# Patient Record
Sex: MALE | Race: WHITE | Employment: OTHER | URBAN - NONMETROPOLITAN AREA
[De-identification: names, ages, dates, MRNs, and addresses within clinical notes are randomized per-mention and may not be internally consistent; named-entity substitution may affect disease eponyms.]

---

## 2017-05-11 ENCOUNTER — OFFICE VISIT (OUTPATIENT)
Age: 67
End: 2017-05-11

## 2017-05-11 VITALS
DIASTOLIC BLOOD PRESSURE: 80 MMHG | HEART RATE: 84 BPM | BODY MASS INDEX: 24.05 KG/M2 | SYSTOLIC BLOOD PRESSURE: 140 MMHG | TEMPERATURE: 97.6 F | HEIGHT: 70 IN | WEIGHT: 168 LBS | OXYGEN SATURATION: 95 % | RESPIRATION RATE: 16 BRPM

## 2017-05-11 DIAGNOSIS — R53.83 FATIGUE, UNSPECIFIED TYPE: ICD-10-CM

## 2017-05-11 DIAGNOSIS — K40.30 INCARCERATED RIGHT INGUINAL HERNIA: Primary | ICD-10-CM

## 2017-05-11 PROCEDURE — 1036F TOBACCO NON-USER: CPT | Performed by: SURGERY

## 2017-05-11 PROCEDURE — 4040F PNEUMOC VAC/ADMIN/RCVD: CPT | Performed by: SURGERY

## 2017-05-11 PROCEDURE — 3017F COLORECTAL CA SCREEN DOC REV: CPT | Performed by: SURGERY

## 2017-05-11 PROCEDURE — G8420 CALC BMI NORM PARAMETERS: HCPCS | Performed by: SURGERY

## 2017-05-11 PROCEDURE — G8427 DOCREV CUR MEDS BY ELIG CLIN: HCPCS | Performed by: SURGERY

## 2017-05-11 PROCEDURE — 1123F ACP DISCUSS/DSCN MKR DOCD: CPT | Performed by: SURGERY

## 2017-05-11 PROCEDURE — 99204 OFFICE O/P NEW MOD 45 MIN: CPT | Performed by: SURGERY

## 2017-05-11 RX ORDER — M-VIT,TX,IRON,MINS/CALC/FOLIC 27MG-0.4MG
1 TABLET ORAL DAILY
COMMUNITY
End: 2020-01-01

## 2017-05-11 ASSESSMENT — ENCOUNTER SYMPTOMS
DIARRHEA: 0
CONSTIPATION: 0
ABDOMINAL PAIN: 0
NAUSEA: 0
BACK PAIN: 0
TROUBLE SWALLOWING: 0
ABDOMINAL DISTENTION: 0
SHORTNESS OF BREATH: 0
SINUS PRESSURE: 0
CHEST TIGHTNESS: 0
BLOOD IN STOOL: 0
WHEEZING: 0
COUGH: 0

## 2017-05-25 ENCOUNTER — OFFICE VISIT (OUTPATIENT)
Age: 67
End: 2017-05-25

## 2017-05-25 VITALS
HEIGHT: 70 IN | RESPIRATION RATE: 15 BRPM | WEIGHT: 181 LBS | OXYGEN SATURATION: 98 % | TEMPERATURE: 97.1 F | DIASTOLIC BLOOD PRESSURE: 74 MMHG | SYSTOLIC BLOOD PRESSURE: 154 MMHG | BODY MASS INDEX: 25.91 KG/M2 | HEART RATE: 87 BPM

## 2017-05-25 DIAGNOSIS — Z01.818 PRE-OP TESTING: ICD-10-CM

## 2017-05-25 DIAGNOSIS — K40.90 UNILATERAL INGUINAL HERNIA WITHOUT OBSTRUCTION OR GANGRENE, RECURRENCE NOT SPECIFIED: Primary | ICD-10-CM

## 2017-05-25 PROCEDURE — 3017F COLORECTAL CA SCREEN DOC REV: CPT | Performed by: SURGERY

## 2017-05-25 PROCEDURE — 1036F TOBACCO NON-USER: CPT | Performed by: SURGERY

## 2017-05-25 PROCEDURE — 99214 OFFICE O/P EST MOD 30 MIN: CPT | Performed by: SURGERY

## 2017-05-25 PROCEDURE — G8427 DOCREV CUR MEDS BY ELIG CLIN: HCPCS | Performed by: SURGERY

## 2017-05-25 PROCEDURE — 4040F PNEUMOC VAC/ADMIN/RCVD: CPT | Performed by: SURGERY

## 2017-05-25 PROCEDURE — G8420 CALC BMI NORM PARAMETERS: HCPCS | Performed by: SURGERY

## 2017-05-25 PROCEDURE — 1123F ACP DISCUSS/DSCN MKR DOCD: CPT | Performed by: SURGERY

## 2017-05-26 ASSESSMENT — ENCOUNTER SYMPTOMS
SINUS PRESSURE: 0
TROUBLE SWALLOWING: 0
BACK PAIN: 0
ABDOMINAL DISTENTION: 0
BLOOD IN STOOL: 0
CONSTIPATION: 0
SHORTNESS OF BREATH: 0
NAUSEA: 0
COUGH: 0
CHEST TIGHTNESS: 0
WHEEZING: 0
ABDOMINAL PAIN: 0
DIARRHEA: 0

## 2017-05-30 ENCOUNTER — TELEPHONE (OUTPATIENT)
Age: 67
End: 2017-05-30

## 2017-06-06 ENCOUNTER — OFFICE VISIT (OUTPATIENT)
Dept: CARDIOLOGY | Age: 67
End: 2017-06-06

## 2017-06-06 VITALS
BODY MASS INDEX: 27.25 KG/M2 | HEART RATE: 78 BPM | SYSTOLIC BLOOD PRESSURE: 138 MMHG | DIASTOLIC BLOOD PRESSURE: 78 MMHG | WEIGHT: 184 LBS | HEIGHT: 69 IN

## 2017-06-06 DIAGNOSIS — Z01.810 PREOP CARDIOVASCULAR EXAM: ICD-10-CM

## 2017-06-06 DIAGNOSIS — R94.31 EKG, ABNORMAL: Primary | ICD-10-CM

## 2017-06-06 DIAGNOSIS — R94.31 EKG ABNORMALITIES: ICD-10-CM

## 2017-06-06 PROCEDURE — 1123F ACP DISCUSS/DSCN MKR DOCD: CPT | Performed by: INTERNAL MEDICINE

## 2017-06-06 PROCEDURE — 4040F PNEUMOC VAC/ADMIN/RCVD: CPT | Performed by: INTERNAL MEDICINE

## 2017-06-06 PROCEDURE — G8427 DOCREV CUR MEDS BY ELIG CLIN: HCPCS | Performed by: INTERNAL MEDICINE

## 2017-06-06 PROCEDURE — 1036F TOBACCO NON-USER: CPT | Performed by: INTERNAL MEDICINE

## 2017-06-06 PROCEDURE — 3017F COLORECTAL CA SCREEN DOC REV: CPT | Performed by: INTERNAL MEDICINE

## 2017-06-06 PROCEDURE — 99203 OFFICE O/P NEW LOW 30 MIN: CPT | Performed by: INTERNAL MEDICINE

## 2017-06-06 PROCEDURE — G8420 CALC BMI NORM PARAMETERS: HCPCS | Performed by: INTERNAL MEDICINE

## 2017-06-20 ENCOUNTER — TELEPHONE (OUTPATIENT)
Age: 67
End: 2017-06-20

## 2017-06-21 ENCOUNTER — TELEPHONE (OUTPATIENT)
Dept: CARDIOLOGY | Age: 67
End: 2017-06-21

## 2017-06-22 ENCOUNTER — OFFICE VISIT (OUTPATIENT)
Dept: CARDIOLOGY | Age: 67
End: 2017-06-22

## 2017-06-22 VITALS
WEIGHT: 180 LBS | HEART RATE: 76 BPM | BODY MASS INDEX: 25.77 KG/M2 | SYSTOLIC BLOOD PRESSURE: 140 MMHG | DIASTOLIC BLOOD PRESSURE: 92 MMHG | HEIGHT: 70 IN

## 2017-06-22 DIAGNOSIS — Z01.810 PREOP CARDIOVASCULAR EXAM: ICD-10-CM

## 2017-06-22 DIAGNOSIS — R94.39 ABNORMAL STRESS ECG: ICD-10-CM

## 2017-06-22 DIAGNOSIS — R94.39 ABNORMAL STRESS TEST: Primary | ICD-10-CM

## 2017-06-22 PROCEDURE — 1036F TOBACCO NON-USER: CPT | Performed by: INTERNAL MEDICINE

## 2017-06-22 PROCEDURE — G8419 CALC BMI OUT NRM PARAM NOF/U: HCPCS | Performed by: INTERNAL MEDICINE

## 2017-06-22 PROCEDURE — G8427 DOCREV CUR MEDS BY ELIG CLIN: HCPCS | Performed by: INTERNAL MEDICINE

## 2017-06-22 PROCEDURE — 4040F PNEUMOC VAC/ADMIN/RCVD: CPT | Performed by: INTERNAL MEDICINE

## 2017-06-22 PROCEDURE — 99213 OFFICE O/P EST LOW 20 MIN: CPT | Performed by: INTERNAL MEDICINE

## 2017-06-22 PROCEDURE — 1123F ACP DISCUSS/DSCN MKR DOCD: CPT | Performed by: INTERNAL MEDICINE

## 2017-06-22 PROCEDURE — 3017F COLORECTAL CA SCREEN DOC REV: CPT | Performed by: INTERNAL MEDICINE

## 2017-06-27 ENCOUNTER — TELEPHONE (OUTPATIENT)
Dept: CARDIOLOGY | Age: 67
End: 2017-06-27

## 2017-06-27 DIAGNOSIS — R93.1 ABNORMAL FINDINGS ON CARDIAC CATHETERIZATION: Primary | ICD-10-CM

## 2017-06-28 ENCOUNTER — TELEPHONE (OUTPATIENT)
Age: 67
End: 2017-06-28

## 2017-07-12 PROBLEM — I25.10 CORONARY ARTERY DISEASE INVOLVING NATIVE CORONARY ARTERY OF NATIVE HEART WITHOUT ANGINA PECTORIS: Status: ACTIVE | Noted: 2017-07-12

## 2017-07-17 ENCOUNTER — OFFICE VISIT (OUTPATIENT)
Dept: SURGERY | Age: 67
End: 2017-07-17

## 2017-07-17 VITALS
HEART RATE: 77 BPM | WEIGHT: 184.5 LBS | SYSTOLIC BLOOD PRESSURE: 140 MMHG | TEMPERATURE: 97.9 F | DIASTOLIC BLOOD PRESSURE: 80 MMHG | HEIGHT: 69 IN | OXYGEN SATURATION: 96 % | BODY MASS INDEX: 27.33 KG/M2 | RESPIRATION RATE: 16 BRPM

## 2017-07-17 DIAGNOSIS — K40.30 INCARCERATED RIGHT INGUINAL HERNIA: Primary | ICD-10-CM

## 2017-07-17 DIAGNOSIS — Z98.890 POST-OPERATIVE STATE: ICD-10-CM

## 2017-07-17 PROCEDURE — 99024 POSTOP FOLLOW-UP VISIT: CPT | Performed by: SURGERY

## 2017-07-24 ENCOUNTER — OFFICE VISIT (OUTPATIENT)
Dept: SURGERY | Age: 67
End: 2017-07-24

## 2017-07-24 DIAGNOSIS — Z48.03 CHANGE OR REMOVAL OF DRAINS: Primary | ICD-10-CM

## 2017-07-24 PROCEDURE — 99024 POSTOP FOLLOW-UP VISIT: CPT | Performed by: SURGERY

## 2017-07-26 ENCOUNTER — TELEPHONE (OUTPATIENT)
Dept: CARDIOLOGY CLINIC | Age: 67
End: 2017-07-26

## 2017-07-26 ENCOUNTER — HOSPITAL ENCOUNTER (OUTPATIENT)
Dept: CARDIAC CATH/INVASIVE PROCEDURES | Age: 67
Discharge: HOME | End: 2017-07-26
Attending: INTERNAL MEDICINE
Payer: MEDICARE

## 2017-07-26 ENCOUNTER — HOSPITAL ENCOUNTER (OUTPATIENT)
Dept: INPATIENT UNIT | Age: 67
Discharge: HOME | End: 2017-07-26
Attending: INTERNAL MEDICINE | Admitting: INTERNAL MEDICINE
Payer: MEDICARE

## 2017-07-26 VITALS
RESPIRATION RATE: 21 BRPM | OXYGEN SATURATION: 95 % | HEART RATE: 64 BPM | WEIGHT: 180 LBS | HEIGHT: 69 IN | TEMPERATURE: 98.8 F | BODY MASS INDEX: 26.66 KG/M2 | SYSTOLIC BLOOD PRESSURE: 113 MMHG | DIASTOLIC BLOOD PRESSURE: 66 MMHG

## 2017-07-26 LAB
ABO: NORMAL
ANION GAP SERPL CALCULATED.3IONS-SCNC: 13 MEQ/L (ref 8–16)
ANTIBODY SCREEN: NORMAL
BUN BLDV-MCNC: 12 MG/DL (ref 7–22)
CALCIUM SERPL-MCNC: 8.8 MG/DL (ref 8.5–10.5)
CHLORIDE BLD-SCNC: 96 MEQ/L (ref 98–111)
CO2: 25 MEQ/L (ref 23–33)
CREAT SERPL-MCNC: 0.6 MG/DL (ref 0.4–1.2)
EKG ATRIAL RATE: 64 BPM
EKG P AXIS: 36 DEGREES
EKG P-R INTERVAL: 206 MS
EKG Q-T INTERVAL: 446 MS
EKG QRS DURATION: 128 MS
EKG QTC CALCULATION (BAZETT): 460 MS
EKG R AXIS: 7 DEGREES
EKG T AXIS: 10 DEGREES
EKG VENTRICULAR RATE: 64 BPM
GFR SERPL CREATININE-BSD FRML MDRD: > 90 ML/MIN/1.73M2
GLUCOSE BLD-MCNC: 97 MG/DL (ref 70–108)
HCT VFR BLD CALC: 38.7 % (ref 42–52)
HEMOGLOBIN: 13.1 GM/DL (ref 14–18)
MCH RBC QN AUTO: 28.8 PG (ref 27–31)
MCHC RBC AUTO-ENTMCNC: 33.9 GM/DL (ref 33–37)
MCV RBC AUTO: 85 FL (ref 80–94)
PDW BLD-RTO: 14 % (ref 11.5–14.5)
PLATELET # BLD: 543 THOU/MM3 (ref 130–400)
PMV BLD AUTO: 7.3 MCM (ref 7.4–10.4)
POTASSIUM SERPL-SCNC: 4.2 MEQ/L (ref 3.5–5.2)
RBC # BLD: 4.55 MILL/MM3 (ref 4.7–6.1)
RH FACTOR: NORMAL
SODIUM BLD-SCNC: 134 MEQ/L (ref 135–145)
WBC # BLD: 10.5 THOU/MM3 (ref 4.8–10.8)

## 2017-07-26 PROCEDURE — 6360000002 HC RX W HCPCS

## 2017-07-26 PROCEDURE — C1769 GUIDE WIRE: HCPCS

## 2017-07-26 PROCEDURE — 6370000000 HC RX 637 (ALT 250 FOR IP)

## 2017-07-26 PROCEDURE — C1874 STENT, COATED/COV W/DEL SYS: HCPCS

## 2017-07-26 PROCEDURE — C9600 PERC DRUG-EL COR STENT SING: HCPCS | Performed by: INTERNAL MEDICINE

## 2017-07-26 PROCEDURE — A4216 STERILE WATER/SALINE, 10 ML: HCPCS

## 2017-07-26 PROCEDURE — 86901 BLOOD TYPING SEROLOGIC RH(D): CPT

## 2017-07-26 PROCEDURE — 93005 ELECTROCARDIOGRAM TRACING: CPT

## 2017-07-26 PROCEDURE — 92928 PRQ TCAT PLMT NTRAC ST 1 LES: CPT | Performed by: INTERNAL MEDICINE

## 2017-07-26 PROCEDURE — 86850 RBC ANTIBODY SCREEN: CPT

## 2017-07-26 PROCEDURE — 80048 BASIC METABOLIC PNL TOTAL CA: CPT

## 2017-07-26 PROCEDURE — C1887 CATHETER, GUIDING: HCPCS

## 2017-07-26 PROCEDURE — C1894 INTRO/SHEATH, NON-LASER: HCPCS

## 2017-07-26 PROCEDURE — 85027 COMPLETE CBC AUTOMATED: CPT

## 2017-07-26 PROCEDURE — 2500000003 HC RX 250 WO HCPCS

## 2017-07-26 PROCEDURE — 2580000003 HC RX 258: Performed by: INTERNAL MEDICINE

## 2017-07-26 PROCEDURE — 86900 BLOOD TYPING SEROLOGIC ABO: CPT

## 2017-07-26 PROCEDURE — C9601 PERC DRUG-EL COR STENT BRAN: HCPCS | Performed by: INTERNAL MEDICINE

## 2017-07-26 PROCEDURE — 2580000003 HC RX 258

## 2017-07-26 PROCEDURE — 36415 COLL VENOUS BLD VENIPUNCTURE: CPT

## 2017-07-26 PROCEDURE — 92929 PR PRQ TRLUML CORONARY STENT W/ANGIO ADDL ART/BRNCH: CPT | Performed by: INTERNAL MEDICINE

## 2017-07-26 RX ORDER — PRASUGREL 10 MG/1
10 TABLET, FILM COATED ORAL DAILY
Qty: 30 TABLET | Refills: 2 | Status: SHIPPED | OUTPATIENT
Start: 2017-07-27 | End: 2017-09-13 | Stop reason: ALTCHOICE

## 2017-07-26 RX ORDER — SODIUM CHLORIDE 0.9 % (FLUSH) 0.9 %
10 SYRINGE (ML) INJECTION PRN
Status: DISCONTINUED | OUTPATIENT
Start: 2017-07-26 | End: 2017-07-26 | Stop reason: HOSPADM

## 2017-07-26 RX ORDER — NITROGLYCERIN 0.4 MG/1
0.4 TABLET SUBLINGUAL EVERY 5 MIN PRN
Status: DISCONTINUED | OUTPATIENT
Start: 2017-07-26 | End: 2017-07-26 | Stop reason: HOSPADM

## 2017-07-26 RX ORDER — SODIUM CHLORIDE 9 MG/ML
100 INJECTION, SOLUTION INTRAVENOUS CONTINUOUS
Status: DISCONTINUED | OUTPATIENT
Start: 2017-07-26 | End: 2017-07-26 | Stop reason: HOSPADM

## 2017-07-26 RX ORDER — SODIUM CHLORIDE 0.9 % (FLUSH) 0.9 %
10 SYRINGE (ML) INJECTION EVERY 12 HOURS SCHEDULED
Status: CANCELLED | OUTPATIENT
Start: 2017-07-26

## 2017-07-26 RX ORDER — ACETAMINOPHEN 325 MG/1
650 TABLET ORAL EVERY 4 HOURS PRN
Status: DISCONTINUED | OUTPATIENT
Start: 2017-07-26 | End: 2017-07-26 | Stop reason: HOSPADM

## 2017-07-26 RX ORDER — SODIUM CHLORIDE 9 MG/ML
100 INJECTION, SOLUTION INTRAVENOUS CONTINUOUS
Status: CANCELLED | OUTPATIENT
Start: 2017-07-26 | End: 2017-07-26

## 2017-07-26 RX ORDER — SODIUM CHLORIDE 9 MG/ML
INJECTION, SOLUTION INTRAVENOUS CONTINUOUS
Status: DISCONTINUED | OUTPATIENT
Start: 2017-07-26 | End: 2017-07-26

## 2017-07-26 RX ORDER — SODIUM CHLORIDE 0.9 % (FLUSH) 0.9 %
10 SYRINGE (ML) INJECTION EVERY 12 HOURS SCHEDULED
Status: DISCONTINUED | OUTPATIENT
Start: 2017-07-26 | End: 2017-07-26 | Stop reason: HOSPADM

## 2017-07-26 RX ORDER — ASPIRIN 325 MG
325 TABLET ORAL ONCE
Status: DISCONTINUED | OUTPATIENT
Start: 2017-07-26 | End: 2017-07-26 | Stop reason: HOSPADM

## 2017-07-26 RX ORDER — SODIUM CHLORIDE 0.9 % (FLUSH) 0.9 %
10 SYRINGE (ML) INJECTION PRN
Status: DISCONTINUED | OUTPATIENT
Start: 2017-07-26 | End: 2017-07-26

## 2017-07-26 RX ORDER — ACETAMINOPHEN 325 MG/1
650 TABLET ORAL EVERY 4 HOURS PRN
Status: CANCELLED | OUTPATIENT
Start: 2017-07-26

## 2017-07-26 RX ORDER — SODIUM CHLORIDE 0.9 % (FLUSH) 0.9 %
10 SYRINGE (ML) INJECTION PRN
Status: CANCELLED | OUTPATIENT
Start: 2017-07-26

## 2017-07-26 RX ORDER — ONDANSETRON 2 MG/ML
4 INJECTION INTRAMUSCULAR; INTRAVENOUS EVERY 6 HOURS PRN
Status: DISCONTINUED | OUTPATIENT
Start: 2017-07-26 | End: 2017-07-26 | Stop reason: HOSPADM

## 2017-07-26 RX ORDER — ONDANSETRON 2 MG/ML
4 INJECTION INTRAMUSCULAR; INTRAVENOUS EVERY 6 HOURS PRN
Status: CANCELLED | OUTPATIENT
Start: 2017-07-26

## 2017-07-26 RX ORDER — SODIUM CHLORIDE 0.9 % (FLUSH) 0.9 %
10 SYRINGE (ML) INJECTION EVERY 12 HOURS SCHEDULED
Status: DISCONTINUED | OUTPATIENT
Start: 2017-07-26 | End: 2017-07-26

## 2017-07-26 RX ADMIN — SODIUM CHLORIDE: 9 INJECTION, SOLUTION INTRAVENOUS at 08:14

## 2017-07-26 ASSESSMENT — PAIN SCALES - GENERAL: PAINLEVEL_OUTOF10: 0

## 2017-07-31 ENCOUNTER — OFFICE VISIT (OUTPATIENT)
Dept: CARDIOLOGY CLINIC | Age: 67
End: 2017-07-31
Payer: MEDICARE

## 2017-07-31 ENCOUNTER — OFFICE VISIT (OUTPATIENT)
Dept: SURGERY | Age: 67
End: 2017-07-31

## 2017-07-31 VITALS
SYSTOLIC BLOOD PRESSURE: 136 MMHG | HEART RATE: 76 BPM | TEMPERATURE: 98.5 F | BODY MASS INDEX: 25.53 KG/M2 | OXYGEN SATURATION: 96 % | DIASTOLIC BLOOD PRESSURE: 66 MMHG | WEIGHT: 172.4 LBS | RESPIRATION RATE: 18 BRPM | HEIGHT: 69 IN

## 2017-07-31 VITALS
SYSTOLIC BLOOD PRESSURE: 136 MMHG | BODY MASS INDEX: 25.45 KG/M2 | HEIGHT: 69 IN | HEART RATE: 64 BPM | DIASTOLIC BLOOD PRESSURE: 70 MMHG | WEIGHT: 171.8 LBS

## 2017-07-31 DIAGNOSIS — K40.30 INCARCERATED RIGHT INGUINAL HERNIA: Primary | ICD-10-CM

## 2017-07-31 DIAGNOSIS — I25.10 CORONARY ARTERY DISEASE INVOLVING NATIVE CORONARY ARTERY OF NATIVE HEART WITHOUT ANGINA PECTORIS: ICD-10-CM

## 2017-07-31 DIAGNOSIS — Z95.820 S/P ANGIOPLASTY WITH STENT: ICD-10-CM

## 2017-07-31 DIAGNOSIS — I25.10 CORONARY ARTERY DISEASE INVOLVING NATIVE CORONARY ARTERY OF NATIVE HEART WITHOUT ANGINA PECTORIS: Primary | ICD-10-CM

## 2017-07-31 PROCEDURE — 99213 OFFICE O/P EST LOW 20 MIN: CPT | Performed by: PHYSICIAN ASSISTANT

## 2017-07-31 PROCEDURE — 3017F COLORECTAL CA SCREEN DOC REV: CPT | Performed by: PHYSICIAN ASSISTANT

## 2017-07-31 PROCEDURE — 99024 POSTOP FOLLOW-UP VISIT: CPT | Performed by: SURGERY

## 2017-07-31 PROCEDURE — 1123F ACP DISCUSS/DSCN MKR DOCD: CPT | Performed by: PHYSICIAN ASSISTANT

## 2017-07-31 PROCEDURE — G8419 CALC BMI OUT NRM PARAM NOF/U: HCPCS | Performed by: PHYSICIAN ASSISTANT

## 2017-07-31 PROCEDURE — G8598 ASA/ANTIPLAT THER USED: HCPCS | Performed by: PHYSICIAN ASSISTANT

## 2017-07-31 PROCEDURE — 1111F DSCHRG MED/CURRENT MED MERGE: CPT | Performed by: PHYSICIAN ASSISTANT

## 2017-07-31 PROCEDURE — G8427 DOCREV CUR MEDS BY ELIG CLIN: HCPCS | Performed by: PHYSICIAN ASSISTANT

## 2017-07-31 PROCEDURE — 4040F PNEUMOC VAC/ADMIN/RCVD: CPT | Performed by: PHYSICIAN ASSISTANT

## 2017-07-31 PROCEDURE — 1036F TOBACCO NON-USER: CPT | Performed by: PHYSICIAN ASSISTANT

## 2017-07-31 RX ORDER — CLOPIDOGREL BISULFATE 75 MG/1
75 TABLET ORAL DAILY
Qty: 30 TABLET | Refills: 3 | Status: SHIPPED | OUTPATIENT
Start: 2017-07-31 | End: 2017-09-13 | Stop reason: SDUPTHER

## 2017-08-11 ENCOUNTER — TELEPHONE (OUTPATIENT)
Dept: CARDIOLOGY CLINIC | Age: 67
End: 2017-08-11

## 2017-09-11 ENCOUNTER — TELEPHONE (OUTPATIENT)
Dept: CARDIOLOGY CLINIC | Age: 67
End: 2017-09-11

## 2017-09-13 ENCOUNTER — OFFICE VISIT (OUTPATIENT)
Dept: CARDIOLOGY CLINIC | Age: 67
End: 2017-09-13
Payer: MEDICARE

## 2017-09-13 VITALS
HEIGHT: 69 IN | BODY MASS INDEX: 25.77 KG/M2 | WEIGHT: 174 LBS | SYSTOLIC BLOOD PRESSURE: 140 MMHG | HEART RATE: 76 BPM | DIASTOLIC BLOOD PRESSURE: 68 MMHG

## 2017-09-13 DIAGNOSIS — I25.10 CORONARY ARTERY DISEASE INVOLVING NATIVE CORONARY ARTERY OF NATIVE HEART WITHOUT ANGINA PECTORIS: Primary | ICD-10-CM

## 2017-09-13 PROCEDURE — G8598 ASA/ANTIPLAT THER USED: HCPCS | Performed by: INTERNAL MEDICINE

## 2017-09-13 PROCEDURE — G8427 DOCREV CUR MEDS BY ELIG CLIN: HCPCS | Performed by: INTERNAL MEDICINE

## 2017-09-13 PROCEDURE — 1123F ACP DISCUSS/DSCN MKR DOCD: CPT | Performed by: INTERNAL MEDICINE

## 2017-09-13 PROCEDURE — 99213 OFFICE O/P EST LOW 20 MIN: CPT | Performed by: INTERNAL MEDICINE

## 2017-09-13 PROCEDURE — 1036F TOBACCO NON-USER: CPT | Performed by: INTERNAL MEDICINE

## 2017-09-13 PROCEDURE — 3017F COLORECTAL CA SCREEN DOC REV: CPT | Performed by: INTERNAL MEDICINE

## 2017-09-13 PROCEDURE — G8417 CALC BMI ABV UP PARAM F/U: HCPCS | Performed by: INTERNAL MEDICINE

## 2017-09-13 PROCEDURE — 4040F PNEUMOC VAC/ADMIN/RCVD: CPT | Performed by: INTERNAL MEDICINE

## 2017-09-13 RX ORDER — SIMVASTATIN 20 MG
20 TABLET ORAL NIGHTLY
Qty: 30 TABLET | Refills: 5 | Status: SHIPPED | OUTPATIENT
Start: 2017-09-13 | End: 2020-01-01

## 2017-09-13 RX ORDER — ASPIRIN 81 MG/1
81 TABLET ORAL DAILY
Qty: 30 TABLET | Refills: 5 | Status: SHIPPED | OUTPATIENT
Start: 2017-09-13 | End: 2020-01-01

## 2017-09-13 RX ORDER — CLOPIDOGREL BISULFATE 75 MG/1
75 TABLET ORAL DAILY
Qty: 30 TABLET | Refills: 5 | Status: SHIPPED | OUTPATIENT
Start: 2017-09-13 | End: 2020-01-01

## 2017-09-13 RX ORDER — AMLODIPINE BESYLATE 2.5 MG/1
2.5 TABLET ORAL DAILY
Qty: 30 TABLET | Refills: 5 | Status: SHIPPED | OUTPATIENT
Start: 2017-09-13 | End: 2020-01-01

## 2017-09-13 RX ORDER — METOPROLOL TARTRATE 37.5 MG/1
37.5 TABLET, FILM COATED ORAL 2 TIMES DAILY
Qty: 60 TABLET | Refills: 5 | Status: SHIPPED | OUTPATIENT
Start: 2017-09-13 | End: 2020-01-01

## 2018-09-26 PROBLEM — Z01.810 PREOP CARDIOVASCULAR EXAM: Status: RESOLVED | Noted: 2017-06-06 | Resolved: 2018-09-26

## 2020-01-01 ENCOUNTER — TELEPHONE (OUTPATIENT)
Dept: FAMILY MEDICINE CLINIC | Age: 70
End: 2020-01-01

## 2020-01-01 ENCOUNTER — OFFICE VISIT (OUTPATIENT)
Dept: FAMILY MEDICINE CLINIC | Age: 70
End: 2020-01-01
Payer: MEDICARE

## 2020-01-01 ENCOUNTER — APPOINTMENT (OUTPATIENT)
Dept: INTERVENTIONAL RADIOLOGY/VASCULAR | Age: 70
End: 2020-01-01
Payer: MEDICARE

## 2020-01-01 ENCOUNTER — HOSPITAL ENCOUNTER (OUTPATIENT)
Age: 70
Setting detail: OBSERVATION
Discharge: HOME | End: 2020-06-02
Attending: EMERGENCY MEDICINE | Admitting: INTERNAL MEDICINE
Payer: MEDICARE

## 2020-01-01 ENCOUNTER — HOSPITAL ENCOUNTER (INPATIENT)
Age: 70
LOS: 2 days | Discharge: ANOTHER HOSPITAL | DRG: 435 | End: 2020-07-08
Attending: EMERGENCY MEDICINE | Admitting: INTERNAL MEDICINE
Payer: MEDICARE

## 2020-01-01 ENCOUNTER — HOSPITAL ENCOUNTER (OUTPATIENT)
Dept: CT IMAGING | Age: 70
Discharge: HOME | End: 2020-06-22
Payer: MEDICARE

## 2020-01-01 ENCOUNTER — APPOINTMENT (OUTPATIENT)
Dept: GENERAL RADIOLOGY | Age: 70
End: 2020-01-01
Payer: MEDICARE

## 2020-01-01 ENCOUNTER — ANESTHESIA EVENT (OUTPATIENT)
Dept: ENDOSCOPY | Age: 70
DRG: 435 | End: 2020-01-01
Payer: MEDICARE

## 2020-01-01 ENCOUNTER — APPOINTMENT (OUTPATIENT)
Dept: CT IMAGING | Age: 70
End: 2020-01-01
Payer: MEDICARE

## 2020-01-01 ENCOUNTER — HOSPITAL ENCOUNTER (OUTPATIENT)
Dept: CT IMAGING | Age: 70
Discharge: HOME | End: 2020-06-30
Payer: MEDICARE

## 2020-01-01 ENCOUNTER — HOSPITAL ENCOUNTER (OUTPATIENT)
Age: 70
Discharge: HOME | End: 2020-06-24
Payer: MEDICARE

## 2020-01-01 ENCOUNTER — ANESTHESIA (OUTPATIENT)
Dept: ENDOSCOPY | Age: 70
DRG: 435 | End: 2020-01-01
Payer: MEDICARE

## 2020-01-01 ENCOUNTER — HOSPITAL ENCOUNTER (OUTPATIENT)
Age: 70
Discharge: HOME | End: 2020-06-30
Payer: MEDICARE

## 2020-01-01 VITALS
WEIGHT: 140 LBS | DIASTOLIC BLOOD PRESSURE: 76 MMHG | HEART RATE: 90 BPM | SYSTOLIC BLOOD PRESSURE: 124 MMHG | OXYGEN SATURATION: 98 % | TEMPERATURE: 97 F | RESPIRATION RATE: 16 BRPM | BODY MASS INDEX: 20.09 KG/M2

## 2020-01-01 VITALS
OXYGEN SATURATION: 96 % | HEART RATE: 74 BPM | WEIGHT: 148.7 LBS | SYSTOLIC BLOOD PRESSURE: 114 MMHG | HEIGHT: 70 IN | BODY MASS INDEX: 21.29 KG/M2 | DIASTOLIC BLOOD PRESSURE: 75 MMHG | TEMPERATURE: 97.9 F | RESPIRATION RATE: 18 BRPM

## 2020-01-01 VITALS
RESPIRATION RATE: 20 BRPM | BODY MASS INDEX: 20.4 KG/M2 | DIASTOLIC BLOOD PRESSURE: 84 MMHG | TEMPERATURE: 98.1 F | WEIGHT: 142.5 LBS | HEART RATE: 79 BPM | HEIGHT: 70 IN | SYSTOLIC BLOOD PRESSURE: 139 MMHG | OXYGEN SATURATION: 96 %

## 2020-01-01 VITALS
HEIGHT: 70 IN | DIASTOLIC BLOOD PRESSURE: 86 MMHG | WEIGHT: 142.2 LBS | OXYGEN SATURATION: 97 % | BODY MASS INDEX: 20.36 KG/M2 | TEMPERATURE: 97.8 F | SYSTOLIC BLOOD PRESSURE: 122 MMHG | RESPIRATION RATE: 18 BRPM | HEART RATE: 62 BPM

## 2020-01-01 VITALS — SYSTOLIC BLOOD PRESSURE: 130 MMHG | DIASTOLIC BLOOD PRESSURE: 83 MMHG | OXYGEN SATURATION: 100 %

## 2020-01-01 VITALS
BODY MASS INDEX: 20.83 KG/M2 | DIASTOLIC BLOOD PRESSURE: 82 MMHG | WEIGHT: 140.6 LBS | RESPIRATION RATE: 18 BRPM | TEMPERATURE: 97.6 F | HEIGHT: 69 IN | OXYGEN SATURATION: 98 % | SYSTOLIC BLOOD PRESSURE: 118 MMHG | HEART RATE: 81 BPM

## 2020-01-01 LAB
ABSOLUTE RETIC #: 90 THOU/MM3 (ref 20–115)
AFP-TUMOR MARKER: 12.2 UG/L
ALBUMIN SERPL-MCNC: 2.5 G/DL (ref 3.5–5.1)
ALBUMIN SERPL-MCNC: 3 G/DL (ref 3.5–5.1)
ALBUMIN SERPL-MCNC: 3.2 G/DL (ref 3.5–5.1)
ALP BLD-CCNC: 258 U/L (ref 38–126)
ALP BLD-CCNC: 350 U/L (ref 38–126)
ALP BLD-CCNC: 403 U/L (ref 38–126)
ALT SERPL-CCNC: 144 U/L (ref 11–66)
ALT SERPL-CCNC: 51 U/L (ref 11–66)
ALT SERPL-CCNC: 67 U/L (ref 11–66)
AMORPHOUS: ABNORMAL
ANION GAP SERPL CALCULATED.3IONS-SCNC: 10 MEQ/L (ref 8–16)
ANION GAP SERPL CALCULATED.3IONS-SCNC: 10 MEQ/L (ref 8–16)
ANION GAP SERPL CALCULATED.3IONS-SCNC: 11 MEQ/L (ref 8–16)
ANION GAP SERPL CALCULATED.3IONS-SCNC: 11 MEQ/L (ref 8–16)
ANION GAP SERPL CALCULATED.3IONS-SCNC: 12 MEQ/L (ref 8–16)
ANION GAP SERPL CALCULATED.3IONS-SCNC: 8 MEQ/L (ref 8–16)
ANION GAP SERPL CALCULATED.3IONS-SCNC: 9 MEQ/L (ref 8–16)
APTT: 29.4 SECONDS (ref 22–38)
APTT: 30.4 SECONDS (ref 22–38)
AST SERPL-CCNC: 29 U/L (ref 5–40)
AST SERPL-CCNC: 43 U/L (ref 5–40)
AST SERPL-CCNC: 86 U/L (ref 5–40)
AVERAGE GLUCOSE: 120 MG/DL (ref 70–126)
BACTERIA: ABNORMAL /HPF
BASOPHILS # BLD: 0.1 %
BASOPHILS # BLD: 0.2 %
BASOPHILS # BLD: 0.4 %
BASOPHILS ABSOLUTE: 0 THOU/MM3 (ref 0–0.1)
BILIRUB SERPL-MCNC: 12.5 MG/DL (ref 0.3–1.2)
BILIRUB SERPL-MCNC: 12.8 MG/DL (ref 0.3–1.2)
BILIRUB SERPL-MCNC: 7.5 MG/DL (ref 0.3–1.2)
BILIRUBIN DIRECT: 5.6 MG/DL (ref 0–0.3)
BILIRUBIN DIRECT: 9.2 MG/DL (ref 0–0.3)
BILIRUBIN DIRECT: > 10 MG/DL (ref 0–0.3)
BILIRUBIN URINE: ABNORMAL
BILIRUBIN, POC: ABNORMAL
BLOOD URINE, POC: NEGATIVE
BLOOD, URINE: NEGATIVE
BUN BLDV-MCNC: 10 MG/DL (ref 7–22)
BUN BLDV-MCNC: 11 MG/DL (ref 7–22)
BUN BLDV-MCNC: 12 MG/DL (ref 7–22)
BUN BLDV-MCNC: 12 MG/DL (ref 7–22)
BUN BLDV-MCNC: 9 MG/DL (ref 7–22)
CA 19-9: 456 U/ML (ref 0–35)
CALCIUM IONIZED: 0.95 MMOL/L (ref 1.12–1.32)
CALCIUM IONIZED: 1.02 MMOL/L (ref 1.12–1.32)
CALCIUM SERPL-MCNC: 7.7 MG/DL (ref 8.5–10.5)
CALCIUM SERPL-MCNC: 7.8 MG/DL (ref 8.5–10.5)
CALCIUM SERPL-MCNC: 7.9 MG/DL (ref 8.5–10.5)
CALCIUM SERPL-MCNC: 8.1 MG/DL (ref 8.5–10.5)
CALCIUM SERPL-MCNC: 8.1 MG/DL (ref 8.5–10.5)
CALCIUM SERPL-MCNC: 8.2 MG/DL (ref 8.5–10.5)
CALCIUM SERPL-MCNC: 8.5 MG/DL (ref 8.5–10.5)
CASTS 2: ABNORMAL /LPF
CASTS UA: ABNORMAL /LPF
CHARACTER, URINE: ABNORMAL
CHLORIDE BLD-SCNC: 101 MEQ/L (ref 98–111)
CHLORIDE BLD-SCNC: 81 MEQ/L (ref 98–111)
CHLORIDE BLD-SCNC: 83 MEQ/L (ref 98–111)
CHLORIDE BLD-SCNC: 88 MEQ/L (ref 98–111)
CHLORIDE BLD-SCNC: 91 MEQ/L (ref 98–111)
CHLORIDE BLD-SCNC: 91 MEQ/L (ref 98–111)
CHLORIDE BLD-SCNC: 92 MEQ/L (ref 98–111)
CHLORIDE BLD-SCNC: 93 MEQ/L (ref 98–111)
CHLORIDE BLD-SCNC: 97 MEQ/L (ref 98–111)
CHOLESTEROL, TOTAL: 78 MG/DL (ref 100–199)
CLARITY, POC: ABNORMAL
CO2: 22 MEQ/L (ref 23–33)
CO2: 24 MEQ/L (ref 23–33)
CO2: 25 MEQ/L (ref 23–33)
CO2: 25 MEQ/L (ref 23–33)
CO2: 29 MEQ/L (ref 23–33)
CO2: 29 MEQ/L (ref 23–33)
COLOR, POC: ABNORMAL
COLOR: ABNORMAL
CREAT SERPL-MCNC: 0.3 MG/DL (ref 0.4–1.2)
CREAT SERPL-MCNC: 0.4 MG/DL (ref 0.4–1.2)
CREAT SERPL-MCNC: 0.7 MG/DL (ref 0.4–1.2)
CREAT SERPL-MCNC: < 0.2 MG/DL (ref 0.4–1.2)
CREATININE URINE: 93.3 MG/DL
CRYSTALS, UA: ABNORMAL
D-DIMER QUANTITATIVE: 587 NG/ML FEU (ref 0–500)
EKG ATRIAL RATE: 112 BPM
EKG ATRIAL RATE: 114 BPM
EKG ATRIAL RATE: 83 BPM
EKG ATRIAL RATE: 84 BPM
EKG ATRIAL RATE: 94 BPM
EKG P AXIS: 19 DEGREES
EKG P AXIS: 38 DEGREES
EKG P AXIS: 47 DEGREES
EKG P AXIS: 48 DEGREES
EKG P AXIS: 50 DEGREES
EKG P-R INTERVAL: 178 MS
EKG P-R INTERVAL: 180 MS
EKG P-R INTERVAL: 194 MS
EKG P-R INTERVAL: 200 MS
EKG P-R INTERVAL: 206 MS
EKG Q-T INTERVAL: 358 MS
EKG Q-T INTERVAL: 364 MS
EKG Q-T INTERVAL: 384 MS
EKG Q-T INTERVAL: 394 MS
EKG Q-T INTERVAL: 400 MS
EKG QRS DURATION: 124 MS
EKG QRS DURATION: 126 MS
EKG QRS DURATION: 130 MS
EKG QTC CALCULATION (BAZETT): 465 MS
EKG QTC CALCULATION (BAZETT): 470 MS
EKG QTC CALCULATION (BAZETT): 480 MS
EKG QTC CALCULATION (BAZETT): 493 MS
EKG QTC CALCULATION (BAZETT): 496 MS
EKG R AXIS: -15 DEGREES
EKG R AXIS: -8 DEGREES
EKG R AXIS: 0 DEGREES
EKG R AXIS: 16 DEGREES
EKG R AXIS: 3 DEGREES
EKG T AXIS: -6 DEGREES
EKG T AXIS: 15 DEGREES
EKG T AXIS: 21 DEGREES
EKG T AXIS: 6 DEGREES
EKG T AXIS: 6 DEGREES
EKG VENTRICULAR RATE: 112 BPM
EKG VENTRICULAR RATE: 114 BPM
EKG VENTRICULAR RATE: 83 BPM
EKG VENTRICULAR RATE: 84 BPM
EKG VENTRICULAR RATE: 94 BPM
EOSINOPHIL # BLD: 0 %
EOSINOPHIL # BLD: 0 %
EOSINOPHIL # BLD: 0.5 %
EOSINOPHILS ABSOLUTE: 0 THOU/MM3 (ref 0–0.4)
EOSINOPHILS ABSOLUTE: 0 THOU/MM3 (ref 0–0.4)
EOSINOPHILS ABSOLUTE: 0.1 THOU/MM3 (ref 0–0.4)
EPITHELIAL CELLS, UA: ABNORMAL /HPF
ERYTHROCYTE [DISTWIDTH] IN BLOOD BY AUTOMATED COUNT: 14.5 % (ref 11.5–14.5)
ERYTHROCYTE [DISTWIDTH] IN BLOOD BY AUTOMATED COUNT: 14.5 % (ref 11.5–14.5)
ERYTHROCYTE [DISTWIDTH] IN BLOOD BY AUTOMATED COUNT: 18.2 % (ref 11.5–14.5)
ERYTHROCYTE [DISTWIDTH] IN BLOOD BY AUTOMATED COUNT: 18.3 % (ref 11.5–14.5)
ERYTHROCYTE [DISTWIDTH] IN BLOOD BY AUTOMATED COUNT: 45.4 FL (ref 35–45)
ERYTHROCYTE [DISTWIDTH] IN BLOOD BY AUTOMATED COUNT: 46.5 FL (ref 35–45)
ERYTHROCYTE [DISTWIDTH] IN BLOOD BY AUTOMATED COUNT: 50.9 FL (ref 35–45)
ERYTHROCYTE [DISTWIDTH] IN BLOOD BY AUTOMATED COUNT: 52.6 FL (ref 35–45)
FERRITIN: 2170 NG/ML (ref 22–322)
FOLATE: 4.6 NG/ML (ref 4.8–24.2)
GFR SERPL CREATININE-BSD FRML MDRD: > 90 ML/MIN/1.73M2
GLUCOSE BLD-MCNC: 107 MG/DL (ref 70–108)
GLUCOSE BLD-MCNC: 118 MG/DL (ref 70–108)
GLUCOSE BLD-MCNC: 120 MG/DL (ref 70–108)
GLUCOSE BLD-MCNC: 124 MG/DL (ref 70–108)
GLUCOSE BLD-MCNC: 143 MG/DL (ref 70–108)
GLUCOSE BLD-MCNC: 190 MG/DL (ref 70–108)
GLUCOSE BLD-MCNC: 196 MG/DL (ref 70–108)
GLUCOSE BLD-MCNC: 229 MG/DL (ref 70–108)
GLUCOSE BLD-MCNC: 87 MG/DL (ref 70–108)
GLUCOSE BLD-MCNC: 87 MG/DL (ref 70–108)
GLUCOSE URINE, POC: ABNORMAL
GLUCOSE URINE: NEGATIVE MG/DL
HBA1C MFR BLD: 5.9 %
HBA1C MFR BLD: 6 % (ref 4.4–6.4)
HCT VFR BLD CALC: 30.9 % (ref 42–52)
HCT VFR BLD CALC: 33.3 % (ref 42–52)
HCT VFR BLD CALC: 39.6 % (ref 42–52)
HCT VFR BLD CALC: 42.3 % (ref 42–52)
HDLC SERPL-MCNC: 32 MG/DL
HEMOGLOBIN: 11.3 GM/DL (ref 14–18)
HEMOGLOBIN: 12.3 GM/DL (ref 14–18)
HEMOGLOBIN: 13.5 GM/DL (ref 14–18)
HEMOGLOBIN: 14.2 GM/DL (ref 14–18)
ICTOTEST: POSITIVE
IMMATURE GRANS (ABS): 0.04 THOU/MM3 (ref 0–0.07)
IMMATURE GRANS (ABS): 0.1 THOU/MM3 (ref 0–0.07)
IMMATURE GRANS (ABS): 0.12 THOU/MM3 (ref 0–0.07)
IMMATURE GRANULOCYTES: 0.4 %
IMMATURE GRANULOCYTES: 0.6 %
IMMATURE GRANULOCYTES: 0.9 %
IMMATURE RETIC FRACT: 7.2 % (ref 2.3–13.4)
INR BLD: 1.29 (ref 0.85–1.13)
INR BLD: 1.31 (ref 0.85–1.13)
INR BLD: 2.5 (ref 0.85–1.13)
IRON SATURATION: 40 % (ref 20–50)
IRON: 53 UG/DL (ref 65–195)
KETONES, POC: ABNORMAL
KETONES, URINE: 40
LDL CHOLESTEROL CALCULATED: 36 MG/DL
LEUKOCYTE EST, POC: NEGATIVE
LEUKOCYTE ESTERASE, URINE: ABNORMAL
LIPASE: 8.5 U/L (ref 5.6–51.3)
LV EF: 63 %
LVEF MODALITY: NORMAL
LYMPHOCYTES # BLD: 18 %
LYMPHOCYTES # BLD: 2 %
LYMPHOCYTES # BLD: 4 %
LYMPHOCYTES ABSOLUTE: 0.3 THOU/MM3 (ref 1–4.8)
LYMPHOCYTES ABSOLUTE: 0.7 THOU/MM3 (ref 1–4.8)
LYMPHOCYTES ABSOLUTE: 1.8 THOU/MM3 (ref 1–4.8)
MAGNESIUM: 1.7 MG/DL (ref 1.6–2.4)
MAGNESIUM: 1.8 MG/DL (ref 1.6–2.4)
MCH RBC QN AUTO: 29.4 PG (ref 26–33)
MCH RBC QN AUTO: 29.5 PG (ref 26–33)
MCH RBC QN AUTO: 29.5 PG (ref 26–33)
MCH RBC QN AUTO: 29.8 PG (ref 26–33)
MCHC RBC AUTO-ENTMCNC: 33.6 GM/DL (ref 32.2–35.5)
MCHC RBC AUTO-ENTMCNC: 34.1 GM/DL (ref 32.2–35.5)
MCHC RBC AUTO-ENTMCNC: 36.6 GM/DL (ref 32.2–35.5)
MCHC RBC AUTO-ENTMCNC: 36.9 GM/DL (ref 32.2–35.5)
MCV RBC AUTO: 79.9 FL (ref 80–94)
MCV RBC AUTO: 81.5 FL (ref 80–94)
MCV RBC AUTO: 86.5 FL (ref 80–94)
MCV RBC AUTO: 87.6 FL (ref 80–94)
MISCELLANEOUS 2: ABNORMAL
MONOCYTES # BLD: 11.2 %
MONOCYTES # BLD: 7.6 %
MONOCYTES # BLD: 8.3 %
MONOCYTES ABSOLUTE: 1.1 THOU/MM3 (ref 0.4–1.3)
MONOCYTES ABSOLUTE: 1.1 THOU/MM3 (ref 0.4–1.3)
MONOCYTES ABSOLUTE: 1.3 THOU/MM3 (ref 0.4–1.3)
MUCUS: ABNORMAL
NITRITE, POC: NEGATIVE
NITRITE, URINE: NEGATIVE
NUCLEATED RED BLOOD CELLS: 0 /100 WBC
OSMOLALITY CALCULATION: 250 MOSMOL/KG (ref 275–300)
OSMOLALITY CALCULATION: 263.6 MOSMOL/KG (ref 275–300)
OSMOLALITY CALCULATION: 265.5 MOSMOL/KG (ref 275–300)
OSMOLALITY URINE: 514 MOSMOL/KG (ref 250–750)
PERFORMING LAB: NORMAL
PH UA: 6 (ref 5–9)
PH, POC: 5.5
PHOSPHORUS: 3 MG/DL (ref 2.4–4.7)
PHOSPHORUS: 3.5 MG/DL (ref 2.4–4.7)
PLATELET # BLD: 294 THOU/MM3 (ref 130–400)
PLATELET # BLD: 301 THOU/MM3 (ref 130–400)
PLATELET # BLD: 375 THOU/MM3 (ref 130–400)
PLATELET # BLD: 432 THOU/MM3 (ref 130–400)
PMV BLD AUTO: 10.7 FL (ref 9.4–12.4)
PMV BLD AUTO: 10.8 FL (ref 9.4–12.4)
PMV BLD AUTO: 9.9 FL (ref 9.4–12.4)
PMV BLD AUTO: 9.9 FL (ref 9.4–12.4)
POTASSIUM REFLEX MAGNESIUM: 3.9 MEQ/L (ref 3.5–5.2)
POTASSIUM REFLEX MAGNESIUM: 3.9 MEQ/L (ref 3.5–5.2)
POTASSIUM SERPL-SCNC: 3.7 MEQ/L (ref 3.5–5.2)
POTASSIUM SERPL-SCNC: 3.7 MEQ/L (ref 3.5–5.2)
POTASSIUM SERPL-SCNC: 3.8 MEQ/L (ref 3.5–5.2)
POTASSIUM SERPL-SCNC: 3.9 MEQ/L (ref 3.5–5.2)
POTASSIUM SERPL-SCNC: 3.9 MEQ/L (ref 3.5–5.2)
POTASSIUM SERPL-SCNC: 4 MEQ/L (ref 3.5–5.2)
POTASSIUM SERPL-SCNC: 4.3 MEQ/L (ref 3.5–5.2)
PRO-BNP: 205.9 PG/ML (ref 0–900)
PROSTATE SPECIFIC ANTIGEN: 0.32 NG/ML (ref 0–1)
PROTEIN UA: 30
PROTEIN, POC: ABNORMAL
RBC # BLD: 3.79 MILL/MM3 (ref 4.7–6.1)
RBC # BLD: 4.17 MILL/MM3 (ref 4.7–6.1)
RBC # BLD: 4.58 MILL/MM3 (ref 4.7–6.1)
RBC # BLD: 4.83 MILL/MM3 (ref 4.7–6.1)
RBC URINE: ABNORMAL /HPF
REASON FOR REJECTION: NORMAL
REASON FOR REJECTION: NORMAL
REJECTED TEST: NORMAL
REJECTED TEST: NORMAL
RENAL EPITHELIAL, UA: ABNORMAL
REPORT: NORMAL
RETIC HEMOGLOBIN: 35.3 PG (ref 28.2–35.7)
RETICULOCYTE ABSOLUTE COUNT: 2.2 % (ref 0.5–2)
SARS-COV-2, NAAT: NOT DETECTED
SARS-COV-2: NOT DETECTED
SEG NEUTROPHILS: 69.5 %
SEG NEUTROPHILS: 87.6 %
SEG NEUTROPHILS: 88.7 %
SEGMENTED NEUTROPHILS ABSOLUTE COUNT: 11.4 THOU/MM3 (ref 1.8–7.7)
SEGMENTED NEUTROPHILS ABSOLUTE COUNT: 14.8 THOU/MM3 (ref 1.8–7.7)
SEGMENTED NEUTROPHILS ABSOLUTE COUNT: 7.1 THOU/MM3 (ref 1.8–7.7)
SODIUM BLD-SCNC: 122 MEQ/L (ref 135–145)
SODIUM BLD-SCNC: 122 MEQ/L (ref 135–145)
SODIUM BLD-SCNC: 124 MEQ/L (ref 135–145)
SODIUM BLD-SCNC: 126 MEQ/L (ref 135–145)
SODIUM BLD-SCNC: 126 MEQ/L (ref 135–145)
SODIUM BLD-SCNC: 127 MEQ/L (ref 135–145)
SODIUM BLD-SCNC: 127 MEQ/L (ref 135–145)
SODIUM BLD-SCNC: 131 MEQ/L (ref 135–145)
SODIUM BLD-SCNC: 133 MEQ/L (ref 135–145)
SODIUM URINE: 33 MEQ/L
SPECIFIC GRAVITY, POC: 1.02
SPECIFIC GRAVITY, URINE: 1.02 (ref 1–1.03)
TOTAL IRON BINDING CAPACITY: 132 UG/DL (ref 171–450)
TOTAL PROTEIN: 4.5 G/DL (ref 6.1–8)
TOTAL PROTEIN: 5.3 G/DL (ref 6.1–8)
TOTAL PROTEIN: 5.8 G/DL (ref 6.1–8)
TRIGL SERPL-MCNC: 48 MG/DL (ref 0–199)
TROPONIN T: 0.02 NG/ML
TROPONIN T: 0.02 NG/ML
TROPONIN T: 0.03 NG/ML
TROPONIN T: 0.03 NG/ML
TSH SERPL DL<=0.05 MIU/L-ACNC: 3.44 UIU/ML (ref 0.4–4.2)
UROBILINOGEN, POC: 1
UROBILINOGEN, URINE: 0.2 EU/DL (ref 0–1)
VITAMIN B-12: > 2000 PG/ML (ref 211–911)
WBC # BLD: 10.2 THOU/MM3 (ref 4.8–10.8)
WBC # BLD: 12.8 THOU/MM3 (ref 4.8–10.8)
WBC # BLD: 16.9 THOU/MM3 (ref 4.8–10.8)
WBC # BLD: 7.9 THOU/MM3 (ref 4.8–10.8)
WBC UA: ABNORMAL /HPF
YEAST: ABNORMAL

## 2020-01-01 PROCEDURE — 2580000003 HC RX 258: Performed by: INTERNAL MEDICINE

## 2020-01-01 PROCEDURE — 85730 THROMBOPLASTIN TIME PARTIAL: CPT

## 2020-01-01 PROCEDURE — 93005 ELECTROCARDIOGRAM TRACING: CPT | Performed by: FAMILY MEDICINE

## 2020-01-01 PROCEDURE — 85025 COMPLETE CBC W/AUTO DIFF WBC: CPT

## 2020-01-01 PROCEDURE — 2580000003 HC RX 258: Performed by: PHYSICIAN ASSISTANT

## 2020-01-01 PROCEDURE — 6360000004 HC RX CONTRAST MEDICATION: Performed by: RADIOLOGY

## 2020-01-01 PROCEDURE — G0378 HOSPITAL OBSERVATION PER HR: HCPCS

## 2020-01-01 PROCEDURE — 84484 ASSAY OF TROPONIN QUANT: CPT

## 2020-01-01 PROCEDURE — 80076 HEPATIC FUNCTION PANEL: CPT

## 2020-01-01 PROCEDURE — 82330 ASSAY OF CALCIUM: CPT

## 2020-01-01 PROCEDURE — G8420 CALC BMI NORM PARAMETERS: HCPCS | Performed by: NURSE PRACTITIONER

## 2020-01-01 PROCEDURE — C1769 GUIDE WIRE: HCPCS | Performed by: INTERNAL MEDICINE

## 2020-01-01 PROCEDURE — 82248 BILIRUBIN DIRECT: CPT

## 2020-01-01 PROCEDURE — 6360000002 HC RX W HCPCS

## 2020-01-01 PROCEDURE — 2720000010 HC SURG SUPPLY STERILE: Performed by: INTERNAL MEDICINE

## 2020-01-01 PROCEDURE — 84100 ASSAY OF PHOSPHORUS: CPT

## 2020-01-01 PROCEDURE — 85027 COMPLETE CBC AUTOMATED: CPT

## 2020-01-01 PROCEDURE — 6360000002 HC RX W HCPCS: Performed by: INTERNAL MEDICINE

## 2020-01-01 PROCEDURE — 99283 EMERGENCY DEPT VISIT LOW MDM: CPT

## 2020-01-01 PROCEDURE — 99233 SBSQ HOSP IP/OBS HIGH 50: CPT | Performed by: HOSPITALIST

## 2020-01-01 PROCEDURE — 2500000003 HC RX 250 WO HCPCS: Performed by: PHYSICIAN ASSISTANT

## 2020-01-01 PROCEDURE — 84153 ASSAY OF PSA TOTAL: CPT

## 2020-01-01 PROCEDURE — 0DJ08ZZ INSPECTION OF UPPER INTESTINAL TRACT, VIA NATURAL OR ARTIFICIAL OPENING ENDOSCOPIC: ICD-10-PCS | Performed by: INTERNAL MEDICINE

## 2020-01-01 PROCEDURE — 2500000003 HC RX 250 WO HCPCS: Performed by: NURSE ANESTHETIST, CERTIFIED REGISTERED

## 2020-01-01 PROCEDURE — 83540 ASSAY OF IRON: CPT

## 2020-01-01 PROCEDURE — 88341 IMHCHEM/IMCYTCHM EA ADD ANTB: CPT

## 2020-01-01 PROCEDURE — 85046 RETICYTE/HGB CONCENTRATE: CPT

## 2020-01-01 PROCEDURE — 6360000002 HC RX W HCPCS: Performed by: PHYSICIAN ASSISTANT

## 2020-01-01 PROCEDURE — 1036F TOBACCO NON-USER: CPT | Performed by: NURSE PRACTITIONER

## 2020-01-01 PROCEDURE — 96374 THER/PROPH/DIAG INJ IV PUSH: CPT

## 2020-01-01 PROCEDURE — 93005 ELECTROCARDIOGRAM TRACING: CPT | Performed by: EMERGENCY MEDICINE

## 2020-01-01 PROCEDURE — 6370000000 HC RX 637 (ALT 250 FOR IP): Performed by: INTERNAL MEDICINE

## 2020-01-01 PROCEDURE — 93880 EXTRACRANIAL BILAT STUDY: CPT

## 2020-01-01 PROCEDURE — 80048 BASIC METABOLIC PNL TOTAL CA: CPT

## 2020-01-01 PROCEDURE — 7100000000 HC PACU RECOVERY - FIRST 15 MIN: Performed by: INTERNAL MEDICINE

## 2020-01-01 PROCEDURE — 88342 IMHCHEM/IMCYTCHM 1ST ANTB: CPT

## 2020-01-01 PROCEDURE — 2580000003 HC RX 258: Performed by: EMERGENCY MEDICINE

## 2020-01-01 PROCEDURE — 83735 ASSAY OF MAGNESIUM: CPT

## 2020-01-01 PROCEDURE — 88333 PATH CONSLTJ SURG CYTO XM 1: CPT

## 2020-01-01 PROCEDURE — 3700000000 HC ANESTHESIA ATTENDED CARE: Performed by: INTERNAL MEDICINE

## 2020-01-01 PROCEDURE — 2580000003 HC RX 258: Performed by: NURSE PRACTITIONER

## 2020-01-01 PROCEDURE — 36415 COLL VENOUS BLD VENIPUNCTURE: CPT

## 2020-01-01 PROCEDURE — 2580000003 HC RX 258: Performed by: HOSPITALIST

## 2020-01-01 PROCEDURE — 71045 X-RAY EXAM CHEST 1 VIEW: CPT

## 2020-01-01 PROCEDURE — 85379 FIBRIN DEGRADATION QUANT: CPT

## 2020-01-01 PROCEDURE — 3700000001 HC ADD 15 MINUTES (ANESTHESIA): Performed by: INTERNAL MEDICINE

## 2020-01-01 PROCEDURE — 99220 PR INITIAL OBSERVATION CARE/DAY 70 MINUTES: CPT | Performed by: INTERNAL MEDICINE

## 2020-01-01 PROCEDURE — 82746 ASSAY OF FOLIC ACID SERUM: CPT

## 2020-01-01 PROCEDURE — 85610 PROTHROMBIN TIME: CPT

## 2020-01-01 PROCEDURE — 83935 ASSAY OF URINE OSMOLALITY: CPT

## 2020-01-01 PROCEDURE — 94760 N-INVAS EAR/PLS OXIMETRY 1: CPT

## 2020-01-01 PROCEDURE — 99204 OFFICE O/P NEW MOD 45 MIN: CPT | Performed by: NURSE PRACTITIONER

## 2020-01-01 PROCEDURE — 2060000000 HC ICU INTERMEDIATE R&B

## 2020-01-01 PROCEDURE — 2500000003 HC RX 250 WO HCPCS: Performed by: INTERNAL MEDICINE

## 2020-01-01 PROCEDURE — 7100000001 HC PACU RECOVERY - ADDTL 15 MIN: Performed by: INTERNAL MEDICINE

## 2020-01-01 PROCEDURE — 4040F PNEUMOC VAC/ADMIN/RCVD: CPT | Performed by: NURSE PRACTITIONER

## 2020-01-01 PROCEDURE — 99220 PR INITIAL OBSERVATION CARE/DAY 70 MINUTES: CPT | Performed by: PSYCHIATRY & NEUROLOGY

## 2020-01-01 PROCEDURE — 2709999900 HC NON-CHARGEABLE SUPPLY: Performed by: INTERNAL MEDICINE

## 2020-01-01 PROCEDURE — 86301 IMMUNOASSAY TUMOR CA 19-9: CPT

## 2020-01-01 PROCEDURE — 6360000002 HC RX W HCPCS: Performed by: NURSE PRACTITIONER

## 2020-01-01 PROCEDURE — 3609018800 HC ERCP DX COLLECTION SPECIMEN BRUSHING/WASHING: Performed by: INTERNAL MEDICINE

## 2020-01-01 PROCEDURE — 82105 ALPHA-FETOPROTEIN SERUM: CPT

## 2020-01-01 PROCEDURE — 77012 CT SCAN FOR NEEDLE BIOPSY: CPT

## 2020-01-01 PROCEDURE — 95819 EEG AWAKE AND ASLEEP: CPT

## 2020-01-01 PROCEDURE — 1123F ACP DISCUSS/DSCN MKR DOCD: CPT | Performed by: NURSE PRACTITIONER

## 2020-01-01 PROCEDURE — 99232 SBSQ HOSP IP/OBS MODERATE 35: CPT | Performed by: NURSE PRACTITIONER

## 2020-01-01 PROCEDURE — 82565 ASSAY OF CREATININE: CPT

## 2020-01-01 PROCEDURE — 82948 REAGENT STRIP/BLOOD GLUCOSE: CPT

## 2020-01-01 PROCEDURE — U0002 COVID-19 LAB TEST NON-CDC: HCPCS

## 2020-01-01 PROCEDURE — 83880 ASSAY OF NATRIURETIC PEPTIDE: CPT

## 2020-01-01 PROCEDURE — 93306 TTE W/DOPPLER COMPLETE: CPT

## 2020-01-01 PROCEDURE — 82570 ASSAY OF URINE CREATININE: CPT

## 2020-01-01 PROCEDURE — 6360000004 HC RX CONTRAST MEDICATION: Performed by: NURSE PRACTITIONER

## 2020-01-01 PROCEDURE — 80053 COMPREHEN METABOLIC PANEL: CPT

## 2020-01-01 PROCEDURE — 82607 VITAMIN B-12: CPT

## 2020-01-01 PROCEDURE — 81001 URINALYSIS AUTO W/SCOPE: CPT

## 2020-01-01 PROCEDURE — 70450 CT HEAD/BRAIN W/O DYE: CPT

## 2020-01-01 PROCEDURE — 6360000002 HC RX W HCPCS: Performed by: NURSE ANESTHETIST, CERTIFIED REGISTERED

## 2020-01-01 PROCEDURE — 6370000000 HC RX 637 (ALT 250 FOR IP)

## 2020-01-01 PROCEDURE — 6370000000 HC RX 637 (ALT 250 FOR IP): Performed by: NURSE PRACTITIONER

## 2020-01-01 PROCEDURE — 84300 ASSAY OF URINE SODIUM: CPT

## 2020-01-01 PROCEDURE — 3017F COLORECTAL CA SCREEN DOC REV: CPT | Performed by: NURSE PRACTITIONER

## 2020-01-01 PROCEDURE — 6360000002 HC RX W HCPCS: Performed by: EMERGENCY MEDICINE

## 2020-01-01 PROCEDURE — G8427 DOCREV CUR MEDS BY ELIG CLIN: HCPCS | Performed by: NURSE PRACTITIONER

## 2020-01-01 PROCEDURE — 96375 TX/PRO/DX INJ NEW DRUG ADDON: CPT

## 2020-01-01 PROCEDURE — 84443 ASSAY THYROID STIM HORMONE: CPT

## 2020-01-01 PROCEDURE — 82728 ASSAY OF FERRITIN: CPT

## 2020-01-01 PROCEDURE — G0439 PPPS, SUBSEQ VISIT: HCPCS | Performed by: NURSE PRACTITIONER

## 2020-01-01 PROCEDURE — 88307 TISSUE EXAM BY PATHOLOGIST: CPT

## 2020-01-01 PROCEDURE — 93005 ELECTROCARDIOGRAM TRACING: CPT | Performed by: INTERNAL MEDICINE

## 2020-01-01 PROCEDURE — 74177 CT ABD & PELVIS W/CONTRAST: CPT

## 2020-01-01 PROCEDURE — 80061 LIPID PANEL: CPT

## 2020-01-01 PROCEDURE — 6370000000 HC RX 637 (ALT 250 FOR IP): Performed by: EMERGENCY MEDICINE

## 2020-01-01 PROCEDURE — 47000 NEEDLE BIOPSY OF LIVER PERQ: CPT

## 2020-01-01 PROCEDURE — 95819 EEG AWAKE AND ASLEEP: CPT | Performed by: PSYCHIATRY & NEUROLOGY

## 2020-01-01 PROCEDURE — 83690 ASSAY OF LIPASE: CPT

## 2020-01-01 PROCEDURE — 2580000003 HC RX 258: Performed by: RADIOLOGY

## 2020-01-01 PROCEDURE — 83036 HEMOGLOBIN GLYCOSYLATED A1C: CPT

## 2020-01-01 PROCEDURE — 83550 IRON BINDING TEST: CPT

## 2020-01-01 PROCEDURE — 81003 URINALYSIS AUTO W/O SCOPE: CPT | Performed by: NURSE PRACTITIONER

## 2020-01-01 PROCEDURE — 99223 1ST HOSP IP/OBS HIGH 75: CPT | Performed by: INTERNAL MEDICINE

## 2020-01-01 RX ORDER — PROMETHAZINE HYDROCHLORIDE 25 MG/1
12.5 TABLET ORAL EVERY 6 HOURS PRN
Status: DISCONTINUED | OUTPATIENT
Start: 2020-01-01 | End: 2020-01-01 | Stop reason: HOSPADM

## 2020-01-01 RX ORDER — LABETALOL HYDROCHLORIDE 5 MG/ML
INJECTION, SOLUTION INTRAVENOUS PRN
Status: DISCONTINUED | OUTPATIENT
Start: 2020-01-01 | End: 2020-01-01 | Stop reason: SDUPTHER

## 2020-01-01 RX ORDER — SODIUM CHLORIDE 0.9 % (FLUSH) 0.9 %
10 SYRINGE (ML) INJECTION PRN
Status: DISCONTINUED | OUTPATIENT
Start: 2020-01-01 | End: 2020-01-01 | Stop reason: HOSPADM

## 2020-01-01 RX ORDER — FENTANYL CITRATE 50 UG/ML
INJECTION, SOLUTION INTRAMUSCULAR; INTRAVENOUS PRN
Status: DISCONTINUED | OUTPATIENT
Start: 2020-01-01 | End: 2020-01-01 | Stop reason: SDUPTHER

## 2020-01-01 RX ORDER — POTASSIUM CHLORIDE 20 MEQ/1
40 TABLET, EXTENDED RELEASE ORAL PRN
Status: DISCONTINUED | OUTPATIENT
Start: 2020-01-01 | End: 2020-01-01 | Stop reason: HOSPADM

## 2020-01-01 RX ORDER — METOCLOPRAMIDE HYDROCHLORIDE 5 MG/ML
5 INJECTION INTRAMUSCULAR; INTRAVENOUS ONCE
Status: COMPLETED | OUTPATIENT
Start: 2020-01-01 | End: 2020-01-01

## 2020-01-01 RX ORDER — SODIUM CHLORIDE 0.9 % (FLUSH) 0.9 %
10 SYRINGE (ML) INJECTION EVERY 12 HOURS SCHEDULED
Status: DISCONTINUED | OUTPATIENT
Start: 2020-01-01 | End: 2020-01-01 | Stop reason: HOSPADM

## 2020-01-01 RX ORDER — ASPIRIN 81 MG/1
324 TABLET, CHEWABLE ORAL ONCE
Status: COMPLETED | OUTPATIENT
Start: 2020-01-01 | End: 2020-01-01

## 2020-01-01 RX ORDER — SODIUM CHLORIDE 450 MG/100ML
INJECTION, SOLUTION INTRAVENOUS CONTINUOUS
Status: DISCONTINUED | OUTPATIENT
Start: 2020-01-01 | End: 2020-01-01 | Stop reason: HOSPADM

## 2020-01-01 RX ORDER — ACETAMINOPHEN 325 MG/1
650 TABLET ORAL EVERY 6 HOURS PRN
Status: DISCONTINUED | OUTPATIENT
Start: 2020-01-01 | End: 2020-01-01 | Stop reason: HOSPADM

## 2020-01-01 RX ORDER — POLYETHYLENE GLYCOL 3350 17 G/17G
17 POWDER, FOR SOLUTION ORAL DAILY PRN
Status: DISCONTINUED | OUTPATIENT
Start: 2020-01-01 | End: 2020-01-01 | Stop reason: HOSPADM

## 2020-01-01 RX ORDER — PROMETHAZINE HYDROCHLORIDE 25 MG/1
12.5 TABLET ORAL EVERY 6 HOURS PRN
Status: DISCONTINUED | OUTPATIENT
Start: 2020-01-01 | End: 2020-01-01

## 2020-01-01 RX ORDER — SODIUM CHLORIDE 9 MG/ML
INJECTION, SOLUTION INTRAVENOUS CONTINUOUS
Status: DISCONTINUED | OUTPATIENT
Start: 2020-01-01 | End: 2020-01-01

## 2020-01-01 RX ORDER — ROCURONIUM BROMIDE 10 MG/ML
INJECTION, SOLUTION INTRAVENOUS PRN
Status: DISCONTINUED | OUTPATIENT
Start: 2020-01-01 | End: 2020-01-01 | Stop reason: SDUPTHER

## 2020-01-01 RX ORDER — PROMETHAZINE HYDROCHLORIDE 25 MG/ML
12.5 INJECTION, SOLUTION INTRAMUSCULAR; INTRAVENOUS EVERY 6 HOURS PRN
Status: DISCONTINUED | OUTPATIENT
Start: 2020-01-01 | End: 2020-01-01 | Stop reason: HOSPADM

## 2020-01-01 RX ORDER — ONDANSETRON 2 MG/ML
4 INJECTION INTRAMUSCULAR; INTRAVENOUS EVERY 6 HOURS PRN
Status: DISCONTINUED | OUTPATIENT
Start: 2020-01-01 | End: 2020-01-01 | Stop reason: HOSPADM

## 2020-01-01 RX ORDER — SUCCINYLCHOLINE CHLORIDE 20 MG/ML
INJECTION INTRAMUSCULAR; INTRAVENOUS PRN
Status: DISCONTINUED | OUTPATIENT
Start: 2020-01-01 | End: 2020-01-01 | Stop reason: SDUPTHER

## 2020-01-01 RX ORDER — POTASSIUM CHLORIDE 7.45 MG/ML
10 INJECTION INTRAVENOUS PRN
Status: DISCONTINUED | OUTPATIENT
Start: 2020-01-01 | End: 2020-01-01 | Stop reason: HOSPADM

## 2020-01-01 RX ORDER — MAGNESIUM SULFATE IN WATER 40 MG/ML
2 INJECTION, SOLUTION INTRAVENOUS PRN
Status: DISCONTINUED | OUTPATIENT
Start: 2020-01-01 | End: 2020-01-01 | Stop reason: HOSPADM

## 2020-01-01 RX ORDER — ACETAMINOPHEN 650 MG/1
650 SUPPOSITORY RECTAL EVERY 6 HOURS PRN
Status: DISCONTINUED | OUTPATIENT
Start: 2020-01-01 | End: 2020-01-01 | Stop reason: HOSPADM

## 2020-01-01 RX ORDER — METOCLOPRAMIDE HYDROCHLORIDE 5 MG/ML
10 INJECTION INTRAMUSCULAR; INTRAVENOUS ONCE
Status: COMPLETED | OUTPATIENT
Start: 2020-01-01 | End: 2020-01-01

## 2020-01-01 RX ORDER — SODIUM CHLORIDE 9 MG/ML
INJECTION, SOLUTION INTRAVENOUS CONTINUOUS
Status: DISCONTINUED | OUTPATIENT
Start: 2020-01-01 | End: 2020-01-01 | Stop reason: SDUPTHER

## 2020-01-01 RX ORDER — METOPROLOL TARTRATE 5 MG/5ML
5 INJECTION INTRAVENOUS EVERY 6 HOURS PRN
Status: DISCONTINUED | OUTPATIENT
Start: 2020-01-01 | End: 2020-01-01 | Stop reason: HOSPADM

## 2020-01-01 RX ORDER — ASPIRIN 81 MG/1
81 TABLET, CHEWABLE ORAL DAILY
Status: DISCONTINUED | OUTPATIENT
Start: 2020-01-01 | End: 2020-01-01 | Stop reason: HOSPADM

## 2020-01-01 RX ORDER — PROPOFOL 10 MG/ML
INJECTION, EMULSION INTRAVENOUS PRN
Status: DISCONTINUED | OUTPATIENT
Start: 2020-01-01 | End: 2020-01-01 | Stop reason: SDUPTHER

## 2020-01-01 RX ORDER — LIDOCAINE HYDROCHLORIDE 20 MG/ML
INJECTION, SOLUTION INFILTRATION; PERINEURAL PRN
Status: DISCONTINUED | OUTPATIENT
Start: 2020-01-01 | End: 2020-01-01 | Stop reason: SDUPTHER

## 2020-01-01 RX ORDER — IBUPROFEN 200 MG
TABLET ORAL ONCE
Status: COMPLETED | OUTPATIENT
Start: 2020-01-01 | End: 2020-01-01

## 2020-01-01 RX ORDER — 0.9 % SODIUM CHLORIDE 0.9 %
500 INTRAVENOUS SOLUTION INTRAVENOUS ONCE
Status: COMPLETED | OUTPATIENT
Start: 2020-01-01 | End: 2020-01-01

## 2020-01-01 RX ORDER — MIDAZOLAM HYDROCHLORIDE 1 MG/ML
1 INJECTION INTRAMUSCULAR; INTRAVENOUS ONCE
Status: COMPLETED | OUTPATIENT
Start: 2020-01-01 | End: 2020-01-01

## 2020-01-01 RX ORDER — ASCORBIC ACID 500 MG
500 TABLET ORAL DAILY PRN
Status: ON HOLD | COMMUNITY
End: 2020-01-01 | Stop reason: HOSPADM

## 2020-01-01 RX ORDER — ADENOSINE 3 MG/ML
INJECTION, SOLUTION INTRAVENOUS
Status: DISCONTINUED
Start: 2020-01-01 | End: 2020-01-01 | Stop reason: WASHOUT

## 2020-01-01 RX ORDER — ONDANSETRON 2 MG/ML
4 INJECTION INTRAMUSCULAR; INTRAVENOUS ONCE
Status: COMPLETED | OUTPATIENT
Start: 2020-01-01 | End: 2020-01-01

## 2020-01-01 RX ORDER — ONDANSETRON 2 MG/ML
INJECTION INTRAMUSCULAR; INTRAVENOUS
Status: COMPLETED
Start: 2020-01-01 | End: 2020-01-01

## 2020-01-01 RX ORDER — POLYETHYLENE GLYCOL 3350 17 G/17G
17 POWDER, FOR SOLUTION ORAL DAILY
Status: DISCONTINUED | OUTPATIENT
Start: 2020-01-01 | End: 2020-01-01 | Stop reason: HOSPADM

## 2020-01-01 RX ORDER — FENTANYL CITRATE 50 UG/ML
50 INJECTION, SOLUTION INTRAMUSCULAR; INTRAVENOUS ONCE
Status: COMPLETED | OUTPATIENT
Start: 2020-01-01 | End: 2020-01-01

## 2020-01-01 RX ORDER — SODIUM CHLORIDE 450 MG/100ML
INJECTION, SOLUTION INTRAVENOUS CONTINUOUS
Status: CANCELLED | OUTPATIENT
Start: 2020-01-01

## 2020-01-01 RX ORDER — 0.9 % SODIUM CHLORIDE 0.9 %
1000 INTRAVENOUS SOLUTION INTRAVENOUS ONCE
Status: COMPLETED | OUTPATIENT
Start: 2020-01-01 | End: 2020-01-01

## 2020-01-01 RX ADMIN — ONDANSETRON 4 MG: 2 INJECTION INTRAMUSCULAR; INTRAVENOUS at 13:21

## 2020-01-01 RX ADMIN — METOPROLOL TARTRATE 5 MG: 5 INJECTION INTRAVENOUS at 00:00

## 2020-01-01 RX ADMIN — LIDOCAINE HYDROCHLORIDE 100 MG: 20 INJECTION, SOLUTION INFILTRATION; PERINEURAL at 14:51

## 2020-01-01 RX ADMIN — SODIUM CHLORIDE: 9 INJECTION, SOLUTION INTRAVENOUS at 15:33

## 2020-01-01 RX ADMIN — PIPERACILLIN AND TAZOBACTAM 3.38 G: 3; .375 INJECTION, POWDER, FOR SOLUTION INTRAVENOUS at 23:46

## 2020-01-01 RX ADMIN — SODIUM CHLORIDE: 4.5 INJECTION, SOLUTION INTRAVENOUS at 23:04

## 2020-01-01 RX ADMIN — FAMOTIDINE 20 MG: 10 INJECTION, SOLUTION INTRAVENOUS at 20:17

## 2020-01-01 RX ADMIN — PROMETHAZINE HYDROCHLORIDE 12.5 MG: 25 TABLET ORAL at 16:22

## 2020-01-01 RX ADMIN — SODIUM CHLORIDE: 9 INJECTION, SOLUTION INTRAVENOUS at 09:28

## 2020-01-01 RX ADMIN — PIPERACILLIN AND TAZOBACTAM 3.38 G: 3; .375 INJECTION, POWDER, FOR SOLUTION INTRAVENOUS at 11:30

## 2020-01-01 RX ADMIN — METOCLOPRAMIDE 10 MG: 5 INJECTION, SOLUTION INTRAMUSCULAR; INTRAVENOUS at 13:49

## 2020-01-01 RX ADMIN — MIDAZOLAM HYDROCHLORIDE 1 MG: 1 INJECTION INTRAMUSCULAR; INTRAVENOUS at 10:29

## 2020-01-01 RX ADMIN — SODIUM CHLORIDE 1000 ML: 9 INJECTION, SOLUTION INTRAVENOUS at 22:38

## 2020-01-01 RX ADMIN — SODIUM CHLORIDE, PRESERVATIVE FREE 10 ML: 5 INJECTION INTRAVENOUS at 09:14

## 2020-01-01 RX ADMIN — IOPAMIDOL 100 ML: 755 INJECTION, SOLUTION INTRAVENOUS at 12:54

## 2020-01-01 RX ADMIN — CALCIUM GLUCONATE 1 G: 98 INJECTION, SOLUTION INTRAVENOUS at 00:31

## 2020-01-01 RX ADMIN — LABETALOL HYDROCHLORIDE 5 MG: 5 INJECTION INTRAVENOUS at 15:16

## 2020-01-01 RX ADMIN — Medication 0.9 G: at 10:42

## 2020-01-01 RX ADMIN — ASPIRIN 81 MG 324 MG: 81 TABLET ORAL at 20:54

## 2020-01-01 RX ADMIN — SODIUM CHLORIDE: 4.5 INJECTION, SOLUTION INTRAVENOUS at 09:01

## 2020-01-01 RX ADMIN — PIPERACILLIN AND TAZOBACTAM 3.38 G: 3; .375 INJECTION, POWDER, FOR SOLUTION INTRAVENOUS at 00:18

## 2020-01-01 RX ADMIN — ASPIRIN 81 MG 81 MG: 81 TABLET ORAL at 09:13

## 2020-01-01 RX ADMIN — SODIUM CHLORIDE: 4.5 INJECTION, SOLUTION INTRAVENOUS at 14:44

## 2020-01-01 RX ADMIN — PHYTONADIONE 10 MG: 10 INJECTION, EMULSION INTRAMUSCULAR; INTRAVENOUS; SUBCUTANEOUS at 20:17

## 2020-01-01 RX ADMIN — PIPERACILLIN AND TAZOBACTAM 3.38 G: 3; .375 INJECTION, POWDER, FOR SOLUTION INTRAVENOUS at 17:34

## 2020-01-01 RX ADMIN — SODIUM CHLORIDE 500 ML: 9 INJECTION, SOLUTION INTRAVENOUS at 13:54

## 2020-01-01 RX ADMIN — ROCURONIUM BROMIDE 5 MG: 10 INJECTION, SOLUTION INTRAVENOUS at 14:51

## 2020-01-01 RX ADMIN — FAMOTIDINE 20 MG: 10 INJECTION, SOLUTION INTRAVENOUS at 11:30

## 2020-01-01 RX ADMIN — PROPOFOL 200 MG: 10 INJECTION, EMULSION INTRAVENOUS at 14:51

## 2020-01-01 RX ADMIN — Medication 10 ML: at 20:18

## 2020-01-01 RX ADMIN — FENTANYL CITRATE 50 MCG: 50 INJECTION, SOLUTION INTRAMUSCULAR; INTRAVENOUS at 15:12

## 2020-01-01 RX ADMIN — INDOMETHACIN 100 MG: 50 SUPPOSITORY RECTAL at 14:30

## 2020-01-01 RX ADMIN — IOHEXOL 50 ML: 240 INJECTION, SOLUTION INTRATHECAL; INTRAVASCULAR; INTRAVENOUS; ORAL at 11:45

## 2020-01-01 RX ADMIN — SODIUM CHLORIDE: 4.5 INJECTION, SOLUTION INTRAVENOUS at 12:30

## 2020-01-01 RX ADMIN — IOPAMIDOL 80 ML: 755 INJECTION, SOLUTION INTRAVENOUS at 10:27

## 2020-01-01 RX ADMIN — METOCLOPRAMIDE 5 MG: 5 INJECTION, SOLUTION INTRAMUSCULAR; INTRAVENOUS at 18:07

## 2020-01-01 RX ADMIN — FAMOTIDINE 20 MG: 10 INJECTION, SOLUTION INTRAVENOUS at 21:26

## 2020-01-01 RX ADMIN — SODIUM CHLORIDE, PRESERVATIVE FREE 10 ML: 5 INJECTION INTRAVENOUS at 23:00

## 2020-01-01 RX ADMIN — SODIUM CHLORIDE: 9 INJECTION, SOLUTION INTRAVENOUS at 20:20

## 2020-01-01 RX ADMIN — Medication 10 ML: at 10:07

## 2020-01-01 RX ADMIN — SUCCINYLCHOLINE CHLORIDE 120 MG: 20 INJECTION, SOLUTION INTRAMUSCULAR; INTRAVENOUS at 14:51

## 2020-01-01 RX ADMIN — PIPERACILLIN AND TAZOBACTAM 3.38 G: 3; .375 INJECTION, POWDER, FOR SOLUTION INTRAVENOUS at 16:22

## 2020-01-01 RX ADMIN — FENTANYL CITRATE 50 MCG: 50 INJECTION, SOLUTION INTRAMUSCULAR; INTRAVENOUS at 10:30

## 2020-01-01 RX ADMIN — ACETAMINOPHEN 650 MG: 325 TABLET ORAL at 23:01

## 2020-01-01 RX ADMIN — Medication 10 ML: at 21:26

## 2020-01-01 ASSESSMENT — PATIENT HEALTH QUESTIONNAIRE - PHQ9
SUM OF ALL RESPONSES TO PHQ QUESTIONS 1-9: 0
SUM OF ALL RESPONSES TO PHQ9 QUESTIONS 1 & 2: 0
1. LITTLE INTEREST OR PLEASURE IN DOING THINGS: 0
SUM OF ALL RESPONSES TO PHQ QUESTIONS 1-9: 0
2. FEELING DOWN, DEPRESSED OR HOPELESS: 0
SUM OF ALL RESPONSES TO PHQ QUESTIONS 1-9: 0
SUM OF ALL RESPONSES TO PHQ QUESTIONS 1-9: 0

## 2020-01-01 ASSESSMENT — ENCOUNTER SYMPTOMS
DIARRHEA: 0
CONSTIPATION: 0
STRIDOR: 0
SINUS PRESSURE: 0
SINUS PRESSURE: 0
COUGH: 0
VOMITING: 0
COUGH: 0
NAUSEA: 1
WHEEZING: 0
SORE THROAT: 0
BACK PAIN: 0
ABDOMINAL DISTENTION: 0
DIARRHEA: 0
ABDOMINAL PAIN: 0
VOICE CHANGE: 0
SHORTNESS OF BREATH: 0
CHEST TIGHTNESS: 0
ABDOMINAL PAIN: 1
CHEST TIGHTNESS: 0
WHEEZING: 0
NAUSEA: 0
SHORTNESS OF BREATH: 0
TROUBLE SWALLOWING: 0
RHINORRHEA: 0
COLOR CHANGE: 0
CONSTIPATION: 0
BACK PAIN: 0
SORE THROAT: 0
VOMITING: 1

## 2020-01-01 ASSESSMENT — LIFESTYLE VARIABLES
HOW OFTEN DURING THE LAST YEAR HAVE YOU NEEDED AN ALCOHOLIC DRINK FIRST THING IN THE MORNING TO GET YOURSELF GOING AFTER A NIGHT OF HEAVY DRINKING: 0
HAVE YOU OR SOMEONE ELSE BEEN INJURED AS A RESULT OF YOUR DRINKING: 0
HOW OFTEN DO YOU HAVE A DRINK CONTAINING ALCOHOL: 3
HOW OFTEN DO YOU HAVE SIX OR MORE DRINKS ON ONE OCCASION: 0
HOW OFTEN DURING THE LAST YEAR HAVE YOU FAILED TO DO WHAT WAS NORMALLY EXPECTED FROM YOU BECAUSE OF DRINKING: 0
AUDIT TOTAL SCORE: 3
AUDIT-C TOTAL SCORE: 3
HAS A RELATIVE, FRIEND, DOCTOR, OR ANOTHER HEALTH PROFESSIONAL EXPRESSED CONCERN ABOUT YOUR DRINKING OR SUGGESTED YOU CUT DOWN: 0
HOW MANY STANDARD DRINKS CONTAINING ALCOHOL DO YOU HAVE ON A TYPICAL DAY: 0
HOW OFTEN DURING THE LAST YEAR HAVE YOU FOUND THAT YOU WERE NOT ABLE TO STOP DRINKING ONCE YOU HAD STARTED: 0
HOW OFTEN DURING THE LAST YEAR HAVE YOU BEEN UNABLE TO REMEMBER WHAT HAPPENED THE NIGHT BEFORE BECAUSE YOU HAD BEEN DRINKING: 0
HOW OFTEN DURING THE LAST YEAR HAVE YOU HAD A FEELING OF GUILT OR REMORSE AFTER DRINKING: 0

## 2020-01-01 ASSESSMENT — PAIN SCALES - GENERAL
PAINLEVEL_OUTOF10: 4
PAINLEVEL_OUTOF10: 2
PAINLEVEL_OUTOF10: 0
PAINLEVEL_OUTOF10: 3
PAINLEVEL_OUTOF10: 0
PAINLEVEL_OUTOF10: 3
PAINLEVEL_OUTOF10: 0

## 2020-01-01 ASSESSMENT — PULMONARY FUNCTION TESTS
PIF_VALUE: 17
PIF_VALUE: 8
PIF_VALUE: 16
PIF_VALUE: 0
PIF_VALUE: 1
PIF_VALUE: 18
PIF_VALUE: 17
PIF_VALUE: 16
PIF_VALUE: 0
PIF_VALUE: 15
PIF_VALUE: 16
PIF_VALUE: 0
PIF_VALUE: 17
PIF_VALUE: 18
PIF_VALUE: 37
PIF_VALUE: 5
PIF_VALUE: 2
PIF_VALUE: 13
PIF_VALUE: 16
PIF_VALUE: 0
PIF_VALUE: 23
PIF_VALUE: 18
PIF_VALUE: 16
PIF_VALUE: 17
PIF_VALUE: 17
PIF_VALUE: 16
PIF_VALUE: 14
PIF_VALUE: 3
PIF_VALUE: 16
PIF_VALUE: 15
PIF_VALUE: 2
PIF_VALUE: 18
PIF_VALUE: 13
PIF_VALUE: 2
PIF_VALUE: 15
PIF_VALUE: 15
PIF_VALUE: 17
PIF_VALUE: 17
PIF_VALUE: 15
PIF_VALUE: 0
PIF_VALUE: 16
PIF_VALUE: 0

## 2020-01-01 ASSESSMENT — PAIN DESCRIPTION - LOCATION
LOCATION: CHEST
LOCATION: ABDOMEN
LOCATION: CHEST

## 2020-01-01 ASSESSMENT — PAIN DESCRIPTION - PAIN TYPE
TYPE: SURGICAL PAIN
TYPE: ACUTE PAIN
TYPE: SURGICAL PAIN

## 2020-01-01 ASSESSMENT — PAIN DESCRIPTION - DESCRIPTORS
DESCRIPTORS: ACHING;CRAMPING
DESCRIPTORS: ACHING

## 2020-01-01 ASSESSMENT — PAIN - FUNCTIONAL ASSESSMENT: PAIN_FUNCTIONAL_ASSESSMENT: 0-10

## 2020-06-01 PROBLEM — R63.4 WEIGHT LOSS: Status: ACTIVE | Noted: 2020-01-01

## 2020-06-01 PROBLEM — R77.8 ELEVATED TROPONIN: Status: ACTIVE | Noted: 2020-01-01

## 2020-06-01 PROBLEM — R55 SYNCOPE AND COLLAPSE: Status: ACTIVE | Noted: 2020-01-01

## 2020-06-02 NOTE — PROCEDURES
[address] [place] [address]                          ELECTROENCEPHALOGRAM REPORT    PATIENT NAME: Stephan Arriaga                     :        1950  MED REC NO:   [id number]                           ROOM:       0032  ACCOUNT NO:   [de-identified]                           ADMIT DATE: 2020  PROVIDER:     Marlee Morgan. Hadley Wilson MD    DATE OF EE2020    REFERRING PROVIDER:  Justus Peterson CNP. CLINICAL HISTORY:  A 77-year-old male with passing out episode while  standing for up to 10 minutes without warning, evaluate for seizure. This is a 17-channel EEG performed without sleep deprivation. Hyperventilation was not performed. Photic stimulation was performed. The patient is described as alert. The background of activity noted to be 9 Hz in the posterior parietal  area, symmetric, well modulated, attenuates with eye opening. The  patient noted to be drowsy during parts of recording. Hyperventilation  was not performed. The patient noted to be asleep during parts of  recording. Photic stimulation was performed without abnormality. There  was no evidence of epileptiform activity appreciated. IMPRESSION:  This is a normal EEG. There was no evidence of  epileptiform activity appreciated.         Se Reddy MD    D: 2020 15:27:24       T: 2020 15:39:04     [id number]  Job#: [id number]     Doc#: [id number]    CC:

## 2020-06-02 NOTE — ED PROVIDER NOTES
Emergency department    CHIEF COMPLAINT       Chief Complaint   Patient presents with    Loss of Consciousness    Fall       Nurses Notes reviewed and I agree except as noted in the HPI. HISTORY OF PRESENT ILLNESS    Jill Fuentes is a 65 y.o. male who presents loss of consciousness, state that he was standing when he passed out. He had no chest pain, lightheadedness prior to the event. Stated did not feel the event coming on either. Onset: Acute  Duration: Prior to arrival  Timing: Resolved  Location of Pain: No pain  Intesity/severity: Loss of consciousness  Modifying Factors: Not apparent  Relieved by;  Previous Episodes; Tx Before arrival: None  REVIEW OF SYSTEMS      Review of Systems   Constitutional: Negative for fever, chills, diaphoresis and fatigue. HENT: Negative for congestion, drooling, facial swelling and sore throat. Eyes: Negative for photophobia, pain and discharge. Respiratory: Negative for cough, shortness of breath, wheezing and stridor. Cardiovascular: Negative for chest pain, palpitations and leg swelling. Positive for syncope. Gastrointestinal: Negative for abdominal pain, blood in stool and abdominal distention. Genitourinary: Negative for dysuria, urgency, hematuria and difficulty urinating. Musculoskeletal: Negative for gait problem, neck pain and neck stiffness. Skin; No rash, No itching  Neurological: Negative for seizures, weakness and numbness. Psychiatric/Behavioral: Negative for hallucinations, confusion and agitation. PAST MEDICAL HISTORY    has a past medical history of Coronary artery disease involving native coronary artery of native heart without angina pectoris. SURGICAL HISTORY      has a past surgical history that includes Inguinal hernia repair (07/12/2017) and Cardiac catheterization (N/A).     CURRENT MEDICATIONS       Current Discharge Medication List      CONTINUE these medications which have NOT CHANGED    Details   vitamin C (ASCORBIC ACID) 500 MG tablet Take 500 mg by mouth daily as needed             ALLERGIES     has No Known Allergies. FAMILY HISTORY     He indicated that his mother is . He indicated that his father is . He indicated that all of his four sisters are alive. He indicated that all of his four brothers are alive. family history includes Cancer in his mother; Diabetes in his sister; No Known Problems in his brother, brother, brother, brother, father, sister, sister, and sister. SOCIAL HISTORY      reports that he has never smoked. He has never used smokeless tobacco. He reports current alcohol use. He reports that he does not use drugs. PHYSICAL EXAM     INITIAL VITALS:  height is 5' 10\" (1.778 m) and weight is 142 lb 8 oz (64.6 kg). His oral temperature is 98.7 °F (37.1 °C). His blood pressure is 154/103 (abnormal) and his pulse is 80. His respiration is 18 and oxygen saturation is 97%. Physical Exam   Constitutional:  well-developed and well-nourished. HENT: Head: Normocephalic, atraumatic, Bilateral external ears normal, Oropharynx mosit, No oral exudates, Nose normal.   Eyes: PERRL, EOMI, Conjunctiva normal, No discharge. No scleral icterus  Neck: Normal range of motion, No tenderness, Supple  Lympatics: No lymphadenopathy. Cardiovascular: Normal rate, regular rhythm, S1 normal and S2 normal.  Exam reveals no gallop. Pulmonary/Chest: Effort normal and breath sounds normal. No accessory muscle usage or stridor. No respiratory distress. no wheezes. has no rales. exhibits no tenderness. Abdominal: Soft. Bowel sounds are normal.  exhibits no distension. There is no tenderness. There is no rebound and no guarding. Extremities: No edema, no tenderness, no cyanosis, no clubbing. Musculoskeletal: Good range of motion in major joints is observed. No major deformities noted. Neurological: Alert and oriented ×3, normal motor function, normal sensory function, no focal deficits.

## 2020-06-02 NOTE — H&P
insight  Capillary Refill: Brisk,< 3 seconds   Peripheral Pulses: +2 palpable, equal bilaterally       Labs:     Recent Labs     06/01/20 1935   WBC 10.2   HGB 14.2   HCT 42.3        Recent Labs     06/01/20 1935   *   K 3.9   CL 97*   CO2 22*   BUN 12   CREATININE 0.7   CALCIUM 8.5     No results for input(s): AST, ALT, BILIDIR, BILITOT, ALKPHOS in the last 72 hours. No results for input(s): INR in the last 72 hours. No results for input(s): Eddye Lipschutz in the last 72 hours. Urinalysis:    No results found for: Yvrose Shackleton, BACTERIA, RBCUA, BLOODU, SPECGRAV, [address]    Radiology:         XR CHEST PORTABLE   Final Result   No acute findings               **This report has been created using voice recognition software. It may contain minor errors which are inherent in voice recognition technology. **      Final report electronically signed by Dr. Mindi Tello on 6/1/2020 8:23 PM      CT HEAD WO CONTRAST   Final Result   No acute intracranial findings. Posterior left scalp hematoma. .               **This report has been created using voice recognition software. It may contain minor errors which are inherent in voice recognition technology. **      Final report electronically signed by Dr. Mindi Tello on 6/1/2020 8:16 PM            DVT prophylaxis: [x] Lovenox                                 [] SCDs                                 [] SQ Heparin                                 [] Encourage ambulation           [] Already on Anticoagulation    Code Status: Full Code      PT/OT Eval Status: Encouraged, if warranted    Disposition:    [x] Home       [] TCU       [] Rehab       [] Psych       [] SNF       [] [facility]       [] Other-    ASSESSMENT:    C[address] Problems    Diagnosis Date Noted    Syncope and collapse [R55] 06/01/2020     Priority: High    Weight loss [R63.4] 06/01/2020    Elevated troponin [R79.89] 06/01/2020    Coronary artery disease involving native

## 2020-06-02 NOTE — PROGRESS NOTES
Hospitalist Progress Note    Patient:  [facility]      Unit/Bed:8B-32/032-A    YOB: 1950    MRN: [id number]       Acct: [de-identified]     PCP: Leonardo Mariscal MD    Date of Admission: 6/1/2020    Assessment/Plan:    1. Syncope and Collapse. CT head negative. Ddimer 587. Echo and Carotid US unremarkable. Troponin mildly bumped, appears to be chronic with similar trend in 2017. EKG Stable. Repeat orthostatics. EEG negative. Cardiology and neurology consulted, await recs. 2. Prediabetes. A1c 6% ? Syncope from hypoglycemia. Trend glucose. 3. HX CAD s/p Stents. Does not take any medications at home? 4. Hyponatremia, mild. Improved. 5. Unintentional weight loss. TSH WNL. PSA 0.32. Addendum:    Seen by cardiology and neurology. Ok to discharge home. F/U PCP in 3-5 days. Condition stable. Discharge time 33 min. Chief Complaint: LOC/Fall    Hospital Course:     65 y.o. male who presented to [facility] with complaints of falling at home. Is reported the patient was at home standing for approximately 10 minutes in the presence of his daughter. He states that he was told that he fell and had loss of consciousness. Patient does not recall falling or events surrounding the fall. Patient does not know how long he was unconscious and there is no family that was present during the episode at the bedside. Patient denies any injury. Denies chest pain or shortness of breath. Denies nausea, vomiting, diarrhea or constipation. Patient denies tobacco use. Denies illicit drug use. He endorses an  alcoholic beverage. Patient has history of CAD however is not on any prescription medications at that time. Patient states his been a while since he seen his doctor. Patient has not had a physical in a while. He believes he may have  had a work physical a few years ago. Subjective (past 24 hours): Upset he hasn't eaten today due to testing. Malcolm CP. No dizziness. Antonio Beasley hours. No results for input(s): INR in the last 72 hours. No results for input(s): Gabriela Albert in the last 72 hours. No results for input(s): PROCAL in the last 72 hours. Microbiology:      Urinalysis:    No results found for: Nathan Khanna, BACTERIA, RBCUA, BLOODU, [address], [address]    Radiology:  XR CHEST PORTABLE   Final Result   No acute findings               **This report has been created using voice recognition software. It may contain minor errors which are inherent in voice recognition technology. **      Final report electronically signed by Dr. Moncho Sow on 6/1/2020 8:23 PM      CT HEAD WO CONTRAST   Final Result   No acute intracranial findings. Posterior left scalp hematoma. .               **This report has been created using voice recognition software. It may contain minor errors which are inherent in voice recognition technology. **      Final report electronically signed by Dr. Moncho Sow on 6/1/2020 8:16 PM      VL DUP CAROTID BILATERAL    (Results Pending)          Code Status: Full Code    Tele:   [x] yes             [] no    Active Hospital Problems    Diagnosis Date Noted    Syncope and collapse [R55] 06/01/2020    Weight loss [R63.4] 06/01/2020    Elevated troponin [R79.89] 06/01/2020    Coronary artery disease involving native coronary artery of native heart without angina pectoris [I25.10] 07/12/2017       Electronically signed by JOSE Osorio CNP on 6/2/2020 at 11:27 AM

## 2020-06-02 NOTE — PLAN OF CARE
Problem: Falls - Risk of:  Goal: Will remain free from falls  Description: Will remain free from falls  6/2/2020 1126 by Claudia Ross RN  Outcome: Ongoing  Note: Pt remains free from falls/injury this shift. Wearing nonslip socks, uses call light appropriately, utilizes hourly rounding. Frequently used items within reach. Goal: Absence of physical injury  Description: Absence of physical injury  6/2/2020 1126 by Claudia Ross RN  Outcome: Ongoing  Note: Pt remains free from falls/injury this shift. Wearing nonslip socks, uses call light appropriately, utilizes hourly rounding. Frequently used items within reach. Problem: Cardiac:  Goal: Ability to maintain an adequate cardiac output will improve  Description: Ability to maintain an adequate cardiac output will improve  6/2/2020 1126 by Claudia Ross RN  Outcome: Ongoing  Note: Blood pressures and pulses WDL. Goal: Hemodynamic stability will improve  Description: Hemodynamic stability will improve  6/2/2020 1126 by Claudia Ross RN  Outcome: Ongoing  Note: Orthostatic blood pressures negative this shift. Problem: Neurological  Goal: Maximum potential motor/sensory/cognitive function  6/2/2020 1126 by Claudia Ross RN  Outcome: Ongoing       Problem: Discharge Planning:  Goal: Discharged to appropriate level of care  Description: Discharged to appropriate level of care  6/2/2020 1126 by Claudia Ross RN  Outcome: Ongoing  Note: Plans to discharge home    Care plan reviewed with patient. Patient verbalize understanding of the plan of care and contribute to goal setting.

## 2020-06-02 NOTE — PROGRESS NOTES
Pharmacy Medication History Note      List of current medications patient is taking is complete. Source of information: patient    Changes made to medication list:  Medications removed (include reason, ex. therapy complete or physician discontinued):  Amlodipine 2.5 mg - no longer taking  Aspirin 81 mg - no longer taking  Clopidogrel 75 mg - no longer taking  Metoprolol tartrate 37.5 mg - no longer taking  Multivitamin - no longer taking  Simvastatin 20 mg - no longer takin    Medications added/doses adjusted:  Vitamin C daily prn added    Other notes (ex. Recent course of antibiotics, Coumadin dosing):  Patient denies being on any prescription medications. States he had some cardiac issues in 2017 but has not had issues since. Denies use of other OTC or herbal medications.           Electronically signed by Evelyn Santacruz [facility] on 6/1/2020 at 8:42 PM

## 2020-06-02 NOTE — PROGRESS NOTES
[facility] Laboratory Technician Worksheet      EEG Date: 2020    Name: Kahlil Genao   : 1950   Age: 65 y.o. SEX: male    ROOM: [facility] MRN: [id number]           CSN: [id number]      Ordering Provider: leopold  EEG Number: [id number] Time of Test:  8362    Hand: Right   Sedation: no    H.V. Done: No NOT DONE Photic: Yes    Sleep: Yes  Drowsy: Yes   Sleep Deprived: No    Seizures observed: no    Mentality: alert      Clinical History:PATIENT WAS STANDING FOR 10 MINUTES AND PASSED OUT. HE HAD NO WARNING SIGNS AND FELT OK AFTER EVENT. HE DOESN'T REMEMBER HOW LONG HE WAS OUT.  CT NO ACUTE    Past Medical History:       Diagnosis Date    Coronary artery disease involving native coronary artery of native heart without angina pectoris 2017       Scheduled Meds:   sodium chloride flush  10 mL Intravenous 2 times per day    enoxaparin  40 mg Subcutaneous Daily    aspirin  81 mg Oral Daily     Continuous Infusions:  PRN Meds:.sodium chloride flush, acetaminophen **OR** acetaminophen, polyethylene glycol, promethazine **OR** ondansetron    Technician: Sierra Vyas 2020

## 2020-06-02 NOTE — FLOWSHEET NOTE
06/02/20 0055   Provider Notification   Reason for Communication Evaluate   Provider Name Topher [address]   Provider Notification Physician   Method of Communication Secure Message   Response Waiting for response   Notification Time 1234     Notified of consult for syncope.

## 2020-06-02 NOTE — PROGRESS NOTES
Nutrition Assessment (Low Risk)    Type and Reason for Visit: Initial, Positive Nutrition Screen(weight loss)    Nutrition Recommendations: Continue current diet. Recommend MVI. Will monitor oral intake for adequacy vs. Need for nutrition interventions. Nutrition Assessment:  Pt. Seen with wife. Reports good appetite and intake - consumes 3 meals/day. 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 States feels good and denies decrease in energy level. Malnutrition Assessment:  · Malnutrition Status: No malnutrition    Nutrition Risk Level   Risk Level: Low    Nutrition Diagnosis:   · Problem: Unintended weight loss  · Etiology: (change in activity, lifting, etc)    Signs and symptoms: Weight loss    Nutrition Intervention:  Food and/or Delivery: Continue current diet  Nutrition Education/Counseling/Coordination of Care:  Continued Inpatient Monitoring, Education Initiated, Coordination of Care(6/2 Encouraged good nutrition at best efforts. Discussed continued 3 meals/day.   Reviewed ways to increase kcals if additional weight loss.)      Electronically signed by Tessa Workman RD, LD on 6/2/20 at 2:20 PM EDT    Contact Number: [phone]

## 2020-06-02 NOTE — CONSULTS
Daily  aspirin chewable tablet 81 mg, Daily         Social History:  Social History     Tobacco Use   Smoking Status Never Smoker   Smokeless Tobacco Never Used     Social History     Substance and Sexual Activity   Alcohol Use Yes    Comment: social     Social History     Substance and Sexual Activity   Drug Use No         Family History:       Problem Relation Age of Onset    Cancer Mother         colon    No Known Problems Father     Diabetes Sister     No Known Problems Brother     No Known Problems Brother     No Known Problems Brother     No Known Problems Brother     No Known Problems Sister     No Known Problems Sister     No Known Problems Sister        Review of Systems:  All systems reviewed and are all negative except what is mentioned in history of present illness. Physical Exam:  /84   Pulse 79   Temp 98.1 °F (36.7 °C) (Oral)   Resp 20   Ht 5' 10\" (1.778 m)   Wt 142 lb 8 oz (64.6 kg)   SpO2 96%   BMI 20.45 kg/m²  I Body mass index is 20.45 kg/m². I   Wt Readings from Last 1 Encounters:   06/01/20 142 lb 8 oz (64.6 kg)           General appearance - alert, well appearing, and in no distress, oriented to  person, place, and time and normal weight  Mental status -Level of Alertness: awake  Orientation: person, place, time  Memory: normal  Fund of Knowledge: normal  Attention/Concentration: normal  Language: normal. Mood is normal  Neck - supple, no neck adenopathy, carotids upstroke normal bilaterally, no carotid bruits. There is no LAP in the neck. There is no thyroid enlargement.     Neurological -   Cranial Nqsqza-EN-TSD:   Cranial nerve II: Normal. There is full visual fields  Cranial nerve III: Pupils: equal, round, reactive to light   Cranial nerves III, IV, VI: Extraocular Movements: intact   Cranial nerve V: Facial sensation: intact   Cranial nerve VII:Facial strength: intact   Cranial nerve VIII: Hearing: intact   Cranial nerve IX: Palate Elevation intact

## 2020-06-02 NOTE — PLAN OF CARE
Problem: Falls - Risk of:  Goal: Will remain free from falls  Description: Will remain free from falls  Outcome: Met This Shift  Note: Patient remained free from falls this shift. Used call light appropriately. Wore nonskid socks when ambulating with assistance. Bed alarm on. Call light in reach. Goal: Absence of physical injury  Description: Absence of physical injury  Outcome: Met This Shift  Note: Patient remained free from physical injury this shift. Used call light appropriately. Wore nonskid socks when ambulating with assistance. Bed alarm on. Call light in reach. Pathway clear. Problem: Cardiac:  Goal: Ability to maintain an adequate cardiac output will improve  Description: Ability to maintain an adequate cardiac output will improve  Outcome: Ongoing  Note: Patient pulses present in all extremities. Continuous telemetry: NSR. VSS. Blood pressure elevated. Cardiology consulted. Will continue to monitor. Goal: Hemodynamic stability will improve  Description: Hemodynamic stability will improve  Outcome: Ongoing  Note: Continuous telemetry: NSR. VSS. Blood pressure elevated. Cardiology consulted. Will continue to monitor vitals Q4H and PRN     Problem: Neurological  Goal: Maximum potential motor/sensory/cognitive function  Outcome: Ongoing  Note: Patient admitted after syncope and collapse at home. Neurology and cardiology consult. Alert and oriented x4. Denies any numbness or tingling. Gait somewhat unsteady. Will continue to monitor. Problem: Discharge Planning:  Goal: Discharged to appropriate level of care  Description: Discharged to appropriate level of care  Outcome: Ongoing  Note: Patient from home with wife. Plans to return home with wife at discharge. Discharge pending clinical course. Plan Echo, lab work, carotid dopplers, neurology consult, cardiology consult. Will continue to monitor for discharge needs    Patient participated in plan of care and contributed to goal setting.

## 2020-06-02 NOTE — PROGRESS NOTES
Patient admitted to [place] [address]. Patient transported in wheelchair by ED staff. Patient with INT in right forearm. IV site free of s/s of infection or infiltration. Patient gait unsteady. When patient went to stand out of chair he stumbled, but did not fall. Patient's wife at bedside. Patient placed in bed. Bed alarm on. Call light in reach. Orthostatic BP obtained. Vitals, assessment and data obtained. Fresh water and snack provided. Patient denies pain or dizziness. Patient denies any complaints at this time. Safety precautions reviewed with patient, patient verbalized understanding of education. Patient oriented to room and call light. Patient and wife oriented to [employer] and denies any further questions at this time. Two nurse skin assessment performed by Ninfa Shen RN and this RN. Explained patients right to have family, representative or physician notified of their admission. Patient has Declined for physician to be notified. Patient has Declined for family/representative to be notified because wife was at bedside at the time of admission.

## 2020-06-02 NOTE — ED NOTES
ED to inpatient nurses report    Chief Complaint   Patient presents with    Loss of Consciousness    Fall      Present to ED from home  LOC: alert and orientated to name, place, date  Vital signs   Vitals:    06/01/20 1922 06/01/20 2118   BP: (!) 166/97 (!) 154/103   Pulse: 84 78   Resp: 18 16   Temp: 98.3 °F (36.8 °C)    TempSrc: Oral    SpO2: 99% 98%   Weight: 140 lb (63.5 kg)    Height: 5' 10\" (1.778 m)       Oxygen Baseline 98    Current needs required room air Bipap/Cpap No  LDAs:   Peripheral IV 06/01/20 Right Forearm (Active)   Site Assessment Clean;Dry; Intact 6/1/2020  9:20 PM   Line Status Normal saline locked 6/1/2020  9:20 PM   Dressing Status Clean;Dry; Intact 6/1/2020  9:20 PM     Mobility: Independent  Pending ED orders: none  Present condition: stable    Electronically signed by Scarlet Dove RN on 6/1/2020 at 9:34 PM       Scarlet Dove RN  06/01/20 2754

## 2020-06-02 NOTE — CONSULTS
Start: 10:36 AM    Study Location: Echo Lab  Technical Quality: Adequate visualization    Indications:Preop cardiac evaluation. Additional Medical History:Abnormal EKG    Patient Status: Routine    Height: 69 inches Weight: 184 pounds BSA: 1.99 m^2 BMI: 27.17 kg/m^2    BP: 138/78 mmHg     Conclusions      Summary   Systolic function was normal.   Ejection fraction is visually estimated at 50 to 55%. Doppler parameters were consistent with abnormal left ventricular   relaxation (grade 1 diastolic dysfunction). Signature      ----------------------------------------------------------------   Electronically signed by Prince Toledo DO (Interpreting   physician) on 06/19/2017 at 07:25 PM   ----------------------------------------------------------------      Findings      Mitral Valve   Structurally normal mitral valve. Trace mitral regurgitation is present. Aortic Valve   The aortic valve appears to be trileaflet with good leaflet separation. Aortic valve leaflets are Mildly calcified. Tricuspid Valve   Tricuspid valve is structurally normal.   Trivial tricuspid regurgitation visualized. Pulmonic Valve   Pulmonic valve is structurally normal.   Trivial pulmonic regurgitation visualized. Left Atrium   Normal size left atrium. Left Ventricle   Systolic function was normal.   Ejection fraction is visually estimated at 50 to 55%. Doppler parameters were consistent with abnormal left ventricular   relaxation (grade 1 diastolic dysfunction). Right Atrium   The right atrium is of normal size. Right Ventricle   Normal right ventricular size and function. Pericardial Effusion   No evidence of any pericardial effusion.      M-Mode/2D Measurements & Calculations      LV Diastolic    LV Systolic Dimension: 2.9  AV Cusp Separation: 2.2 cmLA   Dimension: 4.6  cm                          Dimension: 3.3 cmAO Root   cm              LV Volume Diastolic: 54.8   Dimension: 3.8 cm difficulty walking  Integumentary:  Denies any rash  Neurological:  No numbness or tingling  Endocrine:  Denies any polydipsia. Hematologic/Lymphatic:  Denies any hemorrhage or lymphatic drainage problems. Labs:  CBC:   Recent Labs     06/01/20 1935 06/02/20  0644   WBC 10.2 7.9   HGB 14.2 13.5*   HCT 42.3 39.6*   MCV 87.6 86.5    294     BMP:   Recent Labs     06/01/20 1935 06/02/20  0644   * 133*   K 3.9 3.7   CL 97* 101   CO2 22* 24   BUN 12 12   CREATININE 0.7 0.4     Accucheck Glucoses: No results for input(s): POCGLU in the last 72 hours. Cardiac Enzymes: No results for input(s): CKTOTAL, CKMB, CKMBINDEX, TROPONINI in the last 72 hours. PT/INR: No results for input(s): PROTIME, INR in the last 72 hours. APTT: No results for input(s): APTT in the last 72 hours.   Liver Profile:  No results found for: AST, ALT, ALB, BILIDIR, BILITOT, ALKPHOS, GGT, 5NUC  Lab Results   Component Value Date    CHOL 78 06/02/2020    HDL 32 06/02/2020    TRIG 48 06/02/2020     TSH:   Lab Results   Component Value Date    TSH 3.440 06/01/2020     UA: No results found for: NITRITE, COLORU, PHUR, LABCAST, WBCUA, RBCUA, MUCUS, TRICHOMONAS, YEAST, BACTERIA, CLARITYU, SPECGRAV, LEUKOCYTESUR, UROBILINOGEN, BILIRUBINUR, BLOODU, GLUCOSEU, AMORPHOUS      Physical Exam:  Vitals:    06/02/20 0900   BP: 133/80   Pulse: 75   Resp: 18   Temp: 98.1 °F (36.7 °C)   SpO2: 96%        Intake/Output Summary (Last 24 hours) at 6/2/2020 1036  Last data filed at 6/2/2020 0311  Gross per 24 hour   Intake 410 ml   Output 900 ml   Net -490 ml      General:  No acute distress  Neck: Supple, no JVD, no carotid bruits  Heart: Regular rhythm normal S1 and S2, no rubs, murmurs or gallops  Lungs: clear to ascultation no rales, wheezes, or rhonchi  Abdomen: positive bowel sounds, soft, non-tender, non-distended, no bruits, no masses  Extremities:no clubbing, cyanosis or edema  Neurologic: alert and oriented x 3, cranial nerves 2-12 grossly intact,

## 2020-06-02 NOTE — ED NOTES
Pt resting in bed. Breathing easy and unlabored. Wife at bedside. Denies any further needs.       Bryant Little RN  06/01/20 1673

## 2020-06-09 NOTE — PROGRESS NOTES
Genitourinary: Negative for decreased urine volume, difficulty urinating, dysuria, flank pain, frequency, hematuria and urgency. Musculoskeletal: Negative for arthralgias, back pain, gait problem, joint swelling, myalgias and neck pain. Skin: Negative for color change, pallor and rash. Neurological: Positive for syncope. Negative for dizziness, weakness, light-headedness, numbness and headaches. Hematological: Negative for adenopathy. Psychiatric/Behavioral: Negative for behavioral problems, self-injury and sleep disturbance. The patient is not nervous/anxious. Objective:     Physical Exam  Vitals signs and nursing note reviewed. Constitutional:       Appearance: Normal appearance. He is well-developed. HENT:      Head: Normocephalic. Right Ear: Tympanic membrane and external ear normal. There is no impacted cerumen. Left Ear: External ear normal. There is impacted cerumen. Nose: Nose normal.      Right Sinus: No maxillary sinus tenderness. Left Sinus: No maxillary sinus tenderness. Mouth/Throat:      Pharynx: Uvula midline. Eyes:      Pupils: Pupils are equal, round, and reactive to light. Neck:      Musculoskeletal: Normal range of motion and neck supple. Thyroid: No thyromegaly. Vascular: No carotid bruit or JVD. Trachea: Trachea normal.   Cardiovascular:      Rate and Rhythm: Normal rate and regular rhythm. Heart sounds: Normal heart sounds. No murmur. Pulmonary:      Effort: Pulmonary effort is normal. No respiratory distress. Breath sounds: Normal breath sounds. No decreased breath sounds, wheezing, rhonchi or rales. Chest:      Chest wall: No tenderness. Abdominal:      General: Bowel sounds are normal. There is no distension. Palpations: Abdomen is soft. There is no mass. Tenderness: There is no abdominal tenderness. Musculoskeletal: Normal range of motion. General: No tenderness or deformity.

## 2020-06-25 NOTE — PROGRESS NOTES
Medicare Annual Wellness Visit  Name: Deangelo Zhang Date: 2020   MRN: [id number] Sex: Male   Age: 65 y.o. 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 These results, as well as other patient data from the [address] form, are documented in Flowsheets linked to this Encounter.     No Known Allergies    Prior to Visit Medications    Not on File       Past Medical History:   Diagnosis Date    Coronary artery disease involving native coronary artery of native heart without angina pectoris 2017       Past Surgical History:   Procedure Laterality Date    CARDIAC CATHETERIZATION N/A     2 stents placed by Dr. Lyndsey Zhao  2017    right giant scrotal-Dr Teresita Macedo       Family History   Problem Relation Age of Onset    Cancer Mother         colon    No Known Problems Father     Diabetes Sister     No Known Problems Brother     No Known Problems Brother     No Known Problems Brother     No Known Problems Brother     No Known Problems Sister     No Known Problems Sister     No Known Problems Sister        CareTeam (Including outside providers/suppliers regularly involved in providing care):   Patient Care Team:  JOSE Yang CNP as PCP - General (Family Nurse Practitioner)  JOSE Yang CNP as PCP - [facility] Empaneled Provider    Wt Readings from Last 3 Encounters:   20 142 lb 3.2 oz (64.5 kg)   20 140 lb 9.6 oz (63.8 kg)   20 142 lb 8 oz (64.6 kg)     Vitals:    20 1358   BP: 122/86   Site: Left Upper Arm   Position: Sitting   Cuff Size: Medium Adult   Pulse: 62   Resp: 18   Temp: 97.8 °F (36.6 °C)   TempSrc: Temporal   SpO2: 97%   Weight: 142 lb 3.2 oz (64.5 kg)   Height: 5' 10\" (1.778 m)     Body Interventions:  · referred to GI for workup of weight loss. Safety:  Safety  Do you have working smoke detectors?: Yes  Have all throw rugs been removed or fastened?: (!) No  Do you have non-slip mats or surfaces in all bathtubs/showers?: Yes  Do all of your stairways have a railing or banister?: Yes  Are your doorways, halls and stairs free of clutter?: Yes  Do you always fasten your seatbelt when you are in a car?: Yes  Safety Interventions:  · Home safety tips provided    Personalized Preventive Plan   Current Health Maintenance Status    There is no immunization history on file for this patient. Health Maintenance   Topic Date Due    Hepatitis C screen  1950    DTaP/Tdap/Td vaccine (1 - Tdap) 05/23/1967    Shingles Vaccine (1 of 2) 05/23/1998    Colon cancer screen colonoscopy  05/23/1998    Pneumococcal 65+ years Vaccine (1 of 1 - PPSV23) 05/23/2013    Annual Wellness Visit (AWV)  06/23/2019    Flu vaccine (Season Ended) 09/01/2020    A1C test (Diabetic or Prediabetic)  06/01/2021    Lipid screen  06/02/2025    Hepatitis A vaccine  Aged Out    Hepatitis B vaccine  Aged Out    Hib vaccine  Aged Out    Meningococcal (ACWY) vaccine  Aged Out     Recommendations for Neema Due: see orders and patient instructions/AVS.  . Recommended screening schedule for the next 5-10 years is provided to the patient in written form: see Patient Instructions/AVS.    Venessa Chatterjee was seen today for medicare awv and other. Diagnoses and all orders for this visit:    Routine general medical examination at a health care facility        3 month DOT card given    --JOSE Calvillo CNP on 6/25/2020 at 3:12 PM    An electronic signature was used to authenticate this note.

## 2020-06-25 NOTE — PATIENT INSTRUCTIONS
Personalized Preventive Plan for Magen Baldwin - 6/25/2020  Medicare offers a range of preventive health benefits. Some of the tests and screenings are paid in full while other may be subject to a deductible, co-insurance, and/or copay. Some of these benefits include a comprehensive review of your medical history including lifestyle, illnesses that may run in your family, and various assessments and screenings as appropriate. After reviewing your medical record and screening and assessments performed today your provider may have ordered immunizations, labs, imaging, and/or referrals for you. A list of these orders (if applicable) as well as your Preventive Care list are included within your After Visit Summary for your review. Other Preventive Recommendations:    · A preventive eye exam performed by an eye specialist is recommended every 1-2 years to screen for glaucoma; cataracts, macular degeneration, and other eye disorders. · A preventive dental visit is recommended every 6 months. · Try to get at least 150 minutes of exercise per week or 10,000 steps per day on a pedometer . · Order or download the FREE \"Exercise & Physical Activity: Your Everyday Guide\" from [employer]. Call 9-503.926.2739 or search [employer] online. · You need 0361-0718 mg of calcium and 6897-4354 IU of vitamin D per day. It is possible to meet your calcium requirement with diet alone, but a vitamin D supplement is usually necessary to meet this goal.  · When exposed to the sun, use a sunscreen that protects against both UVA and UVB radiation with an SPF of 30 or greater. Reapply every 2 to 3 hours or after sweating, drying off with a towel, or swimming. · Always wear a seat belt when traveling in a car. Always wear a helmet when riding a bicycle or motorcycle.

## 2020-06-30 NOTE — ANESTHESIA PRE-OP
[facility]  Sedation/Analgesia History & Physical    Pt Name: Herminia Guerrier  MRN: [id number]  YOB: 1950  Provider Performing Procedure: Emile Bryant MD  Primary Care Physician: JOSE Wooten - RHETT    PRE-PROCEDURE   DNR-CCA/DNR-CC []Yes [x]No  Brief History/Pre-Procedure Diagnosis: ct guided liver biopsy          MEDICAL HISTORY  []CAD/Valve  []Liver Disease  []Lung Disease []Diabetes  []Hypertension []Renal Disease  []Additional information:       has a past medical history of Coronary artery disease involving native coronary artery of native heart without angina pectoris. SURGICAL HISTORY   has a past surgical history that includes Inguinal hernia repair (07/12/2017) and Cardiac catheterization (N/A). Additional information:       ALLERGIES   Allergies as of 06/30/2020    (No Known Allergies)     Additional information:       MEDICATIONS   Coumadin Use Last 5 Days []No []Yes  Antiplatelet drug therapy use last 5 days  []No []Yes  Other anticoagulant use last 5 days  []No []Yes  No current outpatient medications on file.     Current Facility-Administered Medications:     0.45 % sodium chloride infusion, , Intravenous, Continuous, Jhon Cali MD, Last Rate: 20 mL/hr at 06/30/20 0901  Prior to Admission medications    Not on File     Additional information:       VITAL SIGNS   Vitals:    06/30/20 0834   BP: 121/85   Pulse: 98   Resp: 18   Temp: 97 °F (36.1 °C)   SpO2: 97%       PHYSICAL:   Heart:  [x]Regular rate and rhythm  []Other:    Lungs:  [x]Clear    []Other:    Abdomen: [x]Soft    []Other:    Mental Status: [x]Alert & Oriented  []Other:      PLANNED PROCEDURE   [x]Biospy []Arteriogram    []Drainage  []Fistulogram []IV access       []Dialysis catheter  []IVC filter []Dialysis catheter []Biliary drainage  []Other:  SEDATION  Planned agent:[x]Midazolam []Meperidine [x]Sublimaze []Morphine  []Diazepam  []Other:     ASA Classification:  []1 []2 [x]3 []4 []5  Class 1: A normal healthy patient  Class 2: Pt with mild to moderate systemic disease  Class 3: Severe systemic disease or disturbance  Class 4: Severe systemic disorders that are already life threatening. Class 5: Moribund pt with little chances of survival, for more than 24 hours. Mallampati I Airway Classification:   []1 []2 []3 []4    [x]Pre-procedure diagnostic studies complete and results available. Comment:    [x]Previous sedation/anesthesia experiences assessed. Comment:    [x]The patient is an appropriate candidate to undergo the planned procedure sedation and anesthesia. (Refer to nursing sedation/analgesia documentation record)  [x]Formulation and discussion of sedation/procedure plan, risks, and expectations with patient and/or responsible adult completed. [x]Patient examined immediately prior to the procedure. (Refer to nursing sedation/analgesia documentation record)    Darryl Strong.  Balwinder Chawla MD  Electronically signed 6/30/2020 at 10:06 AM

## 2020-06-30 NOTE — OP NOTE
Department of Radiology  Post Procedure Progress Note      Pre-Procedure Diagnosis:  Recent weight loss, numerous lesions throughout the liver    Procedure Performed:  CT guided liver mass biopsy    Anesthesia: local / versed and fentanyl    Findings: successful. Pre-assessment was performed by Dr. Reema Ray MD.    Immediate Complications:  None    Estimated Blood Loss: minimal    SEE DICTATED PROCEDURE NOTE FOR COMPLETE DETAILS.     Electronically signed by Imtiaz Fleming MD on 6/30/2020 at 10:48 AM

## 2020-07-01 PROBLEM — R77.8 ELEVATED TROPONIN: Status: RESOLVED | Noted: 2020-01-01 | Resolved: 2020-01-01

## 2020-07-06 PROBLEM — C80.1 OBSTRUCTIVE JAUNDICE DUE TO MALIGNANT NEOPLASM (HCC): Status: ACTIVE | Noted: 2020-01-01

## 2020-07-06 PROBLEM — K83.1 OBSTRUCTIVE JAUNDICE DUE TO MALIGNANT NEOPLASM (HCC): Status: ACTIVE | Noted: 2020-01-01

## 2020-07-06 PROBLEM — R16.0 LIVER MASSES: Status: ACTIVE | Noted: 2020-01-01

## 2020-07-06 PROBLEM — R17 JAUNDICE: Status: ACTIVE | Noted: 2020-01-01

## 2020-07-06 PROBLEM — K86.89 PANCREATIC MASS: Status: ACTIVE | Noted: 2020-01-01

## 2020-07-06 PROBLEM — R10.10 PAIN OF UPPER ABDOMEN: Status: ACTIVE | Noted: 2020-01-01

## 2020-07-06 PROBLEM — R11.2 NON-INTRACTABLE VOMITING WITH NAUSEA: Status: ACTIVE | Noted: 2020-01-01

## 2020-07-06 PROBLEM — Z95.5 HISTORY OF HEART ARTERY STENT: Status: ACTIVE | Noted: 2017-01-01

## 2020-07-06 NOTE — ED PROVIDER NOTES
[address] [facility]        CHIEF COMPLAINT    Chief Complaint   Patient presents with    Abdominal Pain       Nurses Notes reviewed and I agree except as noted in the HPI. HPI    Vladislav Collier is a 65 y.o. male who presents for evaluation of abdominal pain and vomiting. 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 Patient is scheduled to have ERCP by Dr. Camacho Fletcher tomorrow however today the patient has been vomiting 5 times and having more pain in the upper abdomen for which the patient came here. Patient also had colonoscopy recently. Pain is 8/10 however it is elevated better at this time after medication . Patient denies any fever, no chills, no shortness of breath, no chest pain      REVIEW OF SYSTEMS    Review of Systems   Constitutional: Negative for appetite change, chills, diaphoresis, fatigue and fever. HENT: Negative for congestion, ear discharge, ear pain, postnasal drip, rhinorrhea, sinus pressure, sore throat, trouble swallowing and voice change. Respiratory: Negative for cough, chest tightness, shortness of breath and wheezing. Cardiovascular: Negative for chest pain, palpitations and leg swelling. Gastrointestinal: Positive for abdominal pain, nausea and vomiting. Negative for constipation and diarrhea. Musculoskeletal: Negative for arthralgias, back pain, joint swelling, myalgias, neck pain and neck stiffness. Skin: Negative for rash. Neurological: Negative for dizziness, syncope, weakness, light-headedness, numbness and headaches. PAST MEDICAL HISTORY     has a past medical history of Coronary artery disease involving native coronary artery of native heart without angina pectoris and History of heart artery stent.     SURGICAL normal.      Palpations: Abdomen is soft. There is no mass. Tenderness: There is no abdominal tenderness. Lymphadenopathy:      Cervical: No cervical adenopathy. Skin:     Findings: No rash. Neurological:      Mental Status: He is alert and oriented to person, place, and time. Psychiatric:         Behavior: Behavior is cooperative. MEDICAL DECISION MAKING    DIFFERENTIAL DIAGNOSIS:  Nausea vomiting abdominal pain, obstructive jaundice  Metastatic cancer favoring pancreatic      DIAGNOSTIC RESULTS      RADIOLOGY:    No orders to display       LABS:   Labs Reviewed   CBC WITH AUTO DIFFERENTIAL   BASIC METABOLIC PANEL   HEPATIC FUNCTION PANEL   LIPASE   URINE RT REFLEX TO CULTURE   PROTIME-INR     All other unresulted laboratory test above are normal:    Vitals:    Vitals:    07/06/20 1249 07/06/20 1353   BP: (!) 136/92 129/77   Pulse: 91 88   Resp: 16 15   Temp: 98.5 °F (36.9 °C)    TempSrc: Oral    SpO2: 98% 98%   Weight: 140 lb (63.5 kg)        EMERGENCY DEPARTMENT COURSE:    Medications   0.9 % sodium chloride infusion (has no administration in time range)   0.9 % sodium chloride bolus (500 mLs Intravenous New Bag 7/6/20 1354)   ondansetron (ZOFRAN) injection 4 mg (4 mg Intravenous Given 7/6/20 1321)   metoclopramide (REGLAN) injection 10 mg (10 mg Intravenous Given 7/6/20 1349)       The pt was seen and evaluated by me. Within the department, I observed the pt's vitalsigns to be within acceptable range. Laboratory  studies were performed, results were reviewed with the patient. Within the department, the pt was treated with IV fluid hydration, Zofran, Reglan. . I observed the pt's condition to be hemodynamically stable during the duration of their stay. I explained my proposed course of treatment to the pt, and they were amenable to my decision.  The case is referred to the hospitalist Dr. Pro Wynn who graciously admitted the patient with gastroenterology Dr. Katharine Blanca  nurse practitioner being consulted      CRITICAL CARE:   None. CONSULTS:  Dr. Alber Harris CNP gastroenterology services  Dr. Virgil Roy from hospitalist    PROCEDURES:  None. FINAL IMPRESSION       1. Obstructive jaundice due to malignant neoplasm ([address]    2. Nausea and vomiting, intractability of vomiting not specified, unspecified vomiting type    3. Upper abdominal pain          DISPOSITION/PLAN  PATIENT REFERRED TO: Patient is admitted to the hospitalist services, Dr. Virgil Roy with gastroenterology being consulted Dr. Alber Harris CNP. (Please note that portions of this note were completed with a voice recognition program and electronically transcribed. Efforts were [facility] edit the dictations but occasionally words are mis-transcribed . The transcription may contain errors not detected in proofreading.   This transcription was electronically signed.)     07/06/20 2:00 PM      Ben Post MD      Emergency room physician           Ben Post MD  07/06/20 1400

## 2020-07-06 NOTE — PROGRESS NOTES
Patient and wife are requesting their 4-5 adult children be present tomorrow after ERCP to be told liver biopsy results with Dr. Tasia Sanon. Guicho Interiano CNP verified with Dr. Tasia Sanon that he would be able to tell results tomorrow following the procedure. This RN cleared visitors with Quique Skaggs Supervisor for discussion tomorrow.

## 2020-07-06 NOTE — ED NOTES
ED to inpatient nurses report    Chief Complaint   Patient presents with    Abdominal Pain      Present to ED from home  LOC: alert and orientated to name, place, date  Vital signs   Vitals:    07/06/20 1249 07/06/20 1353   BP: (!) 136/92 129/77   Pulse: 91 88   Resp: 16 15   Temp: 98.5 °F (36.9 °C)    TempSrc: Oral    SpO2: 98% 98%   Weight: 140 lb (63.5 kg)       Oxygen Baseline ra    Current needs required ra Bipap/Cpap No  LDAs:   Peripheral IV 07/06/20 Right Forearm (Active)   Site Assessment Clean;Dry; Intact 7/6/2020  1:53 PM   Line Status Infusing 7/6/2020  1:53 PM   Dressing Status Clean;Dry; Intact 7/6/2020  1:53 PM   Dressing Intervention New 7/6/2020 12:49 PM     Mobility: Requires assistance * 1  Pending ED orders: none  Present condition: stable    Electronically signed by Skylar Cowan RN on 7/6/2020 at 2:21 PM     Skylar Cowan [facility]  07/06/20 1428

## 2020-07-06 NOTE — H&P
Problems Father     Diabetes Sister     No Known Problems Brother     No Known Problems Brother     No Known Problems Brother     No Known Problems Brother     No Known Problems Sister     No Known Problems Sister     No Known Problems Sister      SOCHX:   Social History     Socioeconomic History    Marital status:      Spouse name: None    Number of children: None    Years of education: None    Highest education level: None   Occupational History    None   Social Needs    Financial resource strain: None    Food insecurity     Worry: None     Inability: None    Transportation needs     Medical: None     Non-medical: None   Tobacco Use    Smoking status: Never Smoker    Smokeless tobacco: Never Used   Substance and Sexual Activity    Alcohol use: Yes     Comment: social    Drug use: No    Sexual activity: None   Lifestyle    Physical activity     Days per week: None     Minutes per session: None    Stress: None   Relationships    Social connections     Talks on phone: None     Gets together: None     Attends L[employer] service: None     Active member of club or organization: None     Attends meetings of clubs or organizations: None     Relationship status: None    Intimate partner violence     Fear of current or ex partner: None     Emotionally abused: None     Physically abused: None     Forced sexual activity: None   Other Topics Concern    None   Social History Narrative    None      Allergies: Patient has no known allergies. Medications:     sodium chloride        sodium chloride  500 mL Intravenous Once       PHYSICAL EXAM:    /77   Pulse 88   Temp 98.5 °F (36.9 °C) (Oral)   Resp 15   Wt 140 lb (63.5 kg)   SpO2 98%   BMI 20.09 kg/m²     General appearance:  No apparent distress, appears stated age and cooperative. HEENT:  Normal cephalic, atraumatic without obvious deformity. Pupils equal, round, and reactive to light. Extra ocular muscles intact.   Sclerae yellow. Neck: Supple, with full range of motion. no jugular venous distention. Trachea midline. no carotid bruits  Respiratory:  Normal respiratory effort. Clear to auscultation, bilaterally without Rales/Wheezes/Rhonchi. Breath sounds equal bilaterally  Cardiovascular:  Regular rate and rhythm with normal S1/S2 without murmurs, rubs or gallops. PMI non displaced  Abdomen: Soft, slightly tender, non-distended with normal bowel sounds. No guarding, rebound. Musculoskeletal:  No clubbing, cyanosis or edema bilaterally. Full range of motion without deformity. Skin: Skin color jaundice, texture, turgor normal.  No rashes or lesions, or suspicious lesions. Neurologic:  Neurovascularly intact without any focal sensory/motor deficits. Cranial nerves: II-XII intact, grossly non-focal.  Psychiatric:  Alert and oriented, thought content appropriate, normal insight  Capillary Refill: Brisk,< 2 seconds   Peripheral Pulses: +2 palpable, equal bilaterally upper and lower extremities  Lymphatics: no lymphadenopathy    Data: (All radiographs, tracings, PFTs, and imaging are personally viewed and interpreted unless otherwise noted).  WBC 12.8   Platelets 004  Rest of labs are pending at this time  Recent Labs     07/06/20  1348   WBC 12.8*   HGB 12.3*   HCT 33.3*   *       No results for input(s): NA, K, CL, CO2, BUN, CREATININE, CALCIUM, PHOS in the last 72 hours.     Invalid input(s): MAGNES   No results for input(s): AST, ALT, BILIDIR, BILITOT, ALKPHOS in the last 72 hours. Recent Labs     07/06/20  1348   INR 2.50*       No results for input(s): Valentino Bun in the last 72 hours. Radiology reports-   Ct Guided Needle Placement    Result Date: 6/30/2020  CT-GUIDED LIVER BIOPSY: CLINICAL INFORMATION: 68-year-old male with liver lesion PERFORMED BY: Chloe Chawla.  TEA Scott Abts: Right hepatic lobe APPROACH: Lateral aspect right upper quadrant of the abdomen NEEDLE: 19-gauge guiding needle, 20-gauge Bard biopsy gun. SPECIMENS OBTAINED: 4 20-gauge core specimens. SEDATION: Versed 1 mg and fentanyl 50 mcg , IV; the patient was sedated for 15 minutes during this procedure and monitored with EKG and pulse ox monitoring devices by a registered nurse. PROCEDURE: Signed informed consent was obtained prior to performing this procedure. The patient was placed on the CT scanner and sedated, as indicated above. The skin was marked, prepped, and draped in a sterile fashion. Following local anesthesia and utilizing aseptic technique, the needle as indicated above was inserted into the lesion of interest. Appropriate position was documented with CT images. Biopsy specimens were then obtained utilizing coaxial technique. CT images were obtained following the procedure, and reveal no significant postbiopsy hemorrhage. The patient tolerated the procedure well. ALL CT SCANS AT THIS FACILITY use dose modulation, iterative reconstruction, and/or weight-based dosing when appropriate to reduce radiation dose to as low as reasonably achievable. Status post CT-guided liver biopsy. **This report has been created using voice recognition software. It may contain minor errors which are inherent in voice recognition technology. ** Final report electronically signed by Dr Lulú Wong on 6/30/2020 12:54 PM    Ct Abdomen Pelvis W Iv Contrast Additional Contrast? Oral    Result Date: 6/22/2020  PROCEDURE: CT ABDOMEN PELVIS W IV CONTRAST CLINICAL INFORMATION: 24-year-old male with unintentional weight loss 20 pounds in 3 months. Intermittent mid abdominal pain . COMPARISON: CT scan 5/12/2017. TECHNIQUE: 5 mm axial CT images were obtained through the abdomen and pelvis after the administration of intravenous and oral contrast. Coronal and sagittal reconstructions were obtained.  All CT scans at this facility use dose modulation, iterative reconstruction, and/or weight-based dosing when appropriate to reduce radiation dose to as low as reasonably achievable. FINDINGS: There are 2 pulmonary nodules which both measure 4 mm at the anterior aspect of the right lung base on images #12 and 13. Scarring is seen at the bilateral lung bases. The base of the heart is within normal limits. There are coronary artery calcifications. There is no pericardial effusion. There are innumerable low-density lesions scattered throughout the hepatic parenchyma. This finding is new since the prior study. The gallbladder is markedly distended. The spleen and adrenal glands are within normal limits. There is a large cystic mass in the head of the pancreas. It measures approximately 5.6 x 4.8 cm on axial image #39. This is a new finding since the prior exam and highly concerning for malignancy. The kidneys are symmetric in size, shape and degree of enhancement. There is no hydronephrosis. There is no evidence of a small bowel obstruction. There is no colonic wall thickening or edema. The appendix has a normal appearance without inflammatory changes. The urinary bladder has a normal appearance. There are bilateral inguinal hernias. The left side contains fat and a loop of bowel while the right side only contain fat. There is no free intraperitoneal air. Degenerative changes are seen throughout the spine. There are no acute fractures. 1. Cystic mass in the head of the pancreas measuring 5.6 cm in maximum dimension. This is highly concerning for a neoplasm. This is a new finding since the previous CT scan 5/12/2017. 2. Innumerable low-density lesions throughout the hepatic parenchyma highly concerning for metastatic disease. This is also a new finding since the previous exam. 3. 2 pulmonary nodules measuring 4 mm are seen at the right lung base anteriorly. Consider further evaluation with IV contrast enhanced thoracic CT. 4. Markedly distended gallbladder. 5. Bilateral inguinal hernias. Both contain mesenteric fat and on the left side there is a loop of small bowel.

## 2020-07-06 NOTE — ED NOTES
Presents with complaints of abdominal pain and emesis since this morning. States that he is to have a procedure done in the morning for a blocked duct. Family member is hopeful that he can get it done today.      Mayra Whittaker RN  07/06/20 1008

## 2020-07-06 NOTE — ED NOTES
Pt transported to [facility] on cart in stable condition. Floor contacted before transport. Spoke with Margarita Torres.      Cata Vanegas  07/06/20 9669

## 2020-07-06 NOTE — CONSULTS
non-tender, non-distended with active bowel sounds. Musculoskeletal: No clubbing, cyanosis or edema bilaterally. Skin: Jaundice, warm, dry. No rashes or lesions. Psychiatric: Alert and oriented, thought content appropriate, normal insight    Labs:   Recent Labs     07/06/20  1348   WBC 12.8*   HGB 12.3*   HCT 33.3*   *     Recent Labs     07/06/20  1348   *   K 4.0   CL 81*   CO2 29   BUN 9   CREATININE < 0.2*   CALCIUM 8.1*     Recent Labs     07/06/20  1348   AST 43*   ALT 67*   BILIDIR >10.0*   BILITOT 12.8*   ALKPHOS 350*     Recent Labs     07/06/20  1348   INR 2.50*     FINAL DIAGNOSIS:  Liver lesion, core needle biopsies:   Metastatic poorly differentiated carcinoma (CK7+).   Favor pancreatic primary, please see microscopic examination. Specimen:  LIVER BIOPSY, LESION 06/30/20    Radiology:   None    Code Status: Full Code    ASSESSMENT:  1. Obstructive jaundice secondary to metastatic pancreatic cancer  2. Abdominal pain secondary to #1  3. Nausea & vomiting secondary to #1  4. Acute cholangitis  5. Metastatic pancreatic cancer- Patient & wife would like these results directly from Dr. Eleonora Galarza  6. CAD    PLAN:     Monitor labs   IVF per primary   Zosyn Q8 hours   Pain & nausea control per primary   General diet, NPO at midnight   No anticoagulation, including Lovenox   Rapid COVID test for endo procedure   ERCP with possible stent placement 07/07/20 at 1430 with Dr. Amando Reynoso Indomethacin suppositories on call to endo   Case discussed with Dr. Mitchell Beckman care per primary team  Will follow       Case reviewed and impression/plan reviewed in collaboration with Dr. Asuncion Gregorio  Electronically signed by JOSE Vera CNP on 7/6/2020 at 3:43 PM    [facility]  Thank you for the consultation.

## 2020-07-07 NOTE — PLAN OF CARE
Problem: Pain:  Goal: Pain level will decrease  Description: Pain level will decrease  Outcome: Ongoing  Note: Patient denies pain during this shift. Will continue to monitor. Problem: Pain:  Goal: Control of acute pain  Description: Control of acute pain  Outcome: Ongoing  Note: Patient denies pain during this shift. Will continue to monitor. Problem: Pain:  Goal: Control of chronic pain  Description: Control of chronic pain  Outcome: Ongoing  Note: Patient denies pain during this shift. Will continue to monitor. Problem: Falls - Risk of:  Goal: Will remain free from falls  Description: Will remain free from falls  Outcome: Ongoing  Note: Patient educated on and encouraged to use the call light for assistance from staff with needs. Patient verbalizes an understanding of this. Bed wheels locked and bed in lowest position; bed alarm on. Patient possessions are within reach; pathways are clear. Problem: Falls - Risk of:  Goal: Absence of physical injury  Description: Absence of physical injury  Outcome: Ongoing  Note: Patient educated on and encouraged to use the call light for assistance from staff with needs. Patient verbalizes an understanding of this. Bed wheels locked and bed in lowest position; bed alarm on. Patient possessions are within reach; pathways are clear. Problem: Discharge Planning:  Goal: Discharged to appropriate level of care  Description: Discharged to appropriate level of care  Outcome: Ongoing  Note: Discharge planning in progress at this time. Care plan reviewed with patient. Patient verbalizes understanding of the plan of care and contributes to goal setting.

## 2020-07-07 NOTE — PROGRESS NOTES
Patient to endoscopy at this time by patient transport. Patient on cart, . 45% saline infusing at 75ml/hr and zosyn infusing into left hand. Patient's wife at bedside.

## 2020-07-07 NOTE — PROGRESS NOTES
Discussed with primary RN,  [facility]. Palliative care will await until after patient/family are given biopsy results.

## 2020-07-07 NOTE — ANESTHESIA POSTPROCEDURE EVALUATION
Department of Anesthesiology  Postprocedure Note    Patient: Festus Ashley  MRN: [id number]  [place]: 1950  Date of evaluation: 7/7/2020  Time:  3:35 PM     Procedure Summary     Date:  07/07/20 Room / Location:  [address] [place] [address] / [address] [place] [address]    Anesthesia Start:  1514 Anesthesia Stop:  3711    Procedure:  ERCP DIAGNOSTIC (N/A ) Diagnosis:  (MASS HEAD OF PANCREAS, POSSIBLE METS TO LIVER)    Surgeon:  Manohar Mcclure MD Responsible Provider:  Eddie Pino MD    Anesthesia Type:  general ASA Status:  3          Anesthesia Type: general    Yohana Phase I: Yohana Score: 9    Yohana Phase II:      Last vitals: Reviewed and per EMR flowsheets.        Anesthesia Post Evaluation    Patient location during evaluation: bedside  Patient participation: complete - patient cannot participate  Level of consciousness: awake  Pain score: 0  Airway patency: patent  Nausea & Vomiting: no vomiting and no nausea  Complications: no  Cardiovascular status: hemodynamically stable  Respiratory status: acceptable  Hydration status: stable

## 2020-07-07 NOTE — PROGRESS NOTES
Unable to complete ERCP due to stricture, not allowing us to visualize the papilla.         Scope Number

## 2020-07-07 NOTE — H&P
[facility]  Sedation/Analgesia History & Physical    Patient: Myles Rubio : 1950  [facility] Rec#: [id number] Acc#: [id number]   Provider Performing Procedure: Manuel Andrews  Primary Care Physician: JOSE Pantoja CNP    PRE-PROCEDURE   Full CODE [x]Yes  DNR-CCA/DNR-CC []Yes   Brief History/Pre-Procedure Diagnosis:jaundice          MEDICAL HISTORY  []CAD/Valve  []Liver Disease  []Lung Disease []Diabetes  []Hypertension []Renal Disease  []Additional information:       has a past medical history of Coronary artery disease involving native coronary artery of native heart without angina pectoris and History of heart artery stent. SURGICAL HISTORY   has a past surgical history that includes Inguinal hernia repair (2017) and Cardiac catheterization (N/A).   Additional information:       ALLERGIES   Allergies as of 2020    (No Known Allergies)     Additional information:       MEDICATIONS   Coumadin Use Last 7 Days [x]No []Yes  Antiplatelet drug therapy use last 7 days  [x]No []Yes  Other anticoagulant use last 7 days  [x]No []Yes    Current Facility-Administered Medications:     0.45 % sodium chloride infusion, , Intravenous, Continuous, Lee Arevalo MD, Last Rate: 75 mL/hr at 20 1230    sodium chloride flush 0.9 % injection 10 mL, 10 mL, Intravenous, 2 times per day, Onel Leone DO, 10 mL at 20 1007    sodium chloride flush 0.9 % injection 10 mL, 10 mL, Intravenous, PRN, Onel Leone DO    acetaminophen (TYLENOL) tablet 650 mg, 650 mg, Oral, Q6H PRN, 650 mg at 20 2301 **OR** acetaminophen (TYLENOL) suppository 650 mg, 650 mg, Rectal, Q6H PRN, Onel Leone DO    polyethylene glycol (GLYCOLAX) packet 17 g, 17 g, Oral, Daily, Onel Leone DO    [DISCONTINUED] promethazine (PHENERGAN) tablet 12.5 mg, 12.5 mg, Oral, Q6H PRN, 12.5 mg at 20 1622 **OR** ondansetron (ZOFRAN) injection 4 mg, 4 mg, Intravenous, Q6H PRN, DO Gama Crowell [Held by provider] enoxaparin (LOVENOX) injection 40 mg, 40 mg, Subcutaneous, Daily, [facility], DO    magnesium sulfate 2 g in 50 mL IVPB premix, 2 g, Intravenous, PRN, [facility], DO    potassium chloride (KLOR-CON M) extended release tablet 40 mEq, 40 mEq, Oral, PRN **OR** potassium bicarb-citric acid (EFFER-K) effervescent tablet 40 mEq, 40 mEq, Oral, PRN **OR** potassium chloride 10 mEq/100 mL IVPB (Peripheral Line), 10 mEq, Intravenous, PRN, [facility], DO    famotidine (PEPCID) injection 20 mg, 20 mg, Intravenous, BID, Trey Neely, , 20 mg at 07/07/20 1130    piperacillin-tazobactam (ZOSYN) 3.375 g in dextrose 5 % 50 mL IVPB extended infusion (mini-bag), 3.375 g, Intravenous, Q8H, JOSE Cerda CNP, Last Rate: 12.5 mL/hr at 07/07/20 1130, 3.375 g at 07/07/20 1130    indomethacin (INDOCIN) 50 MG suppository 100 mg, 100 mg, Rectal, Once, JOSE Cerda CNP    promethazine (PHENERGAN) injection 12.5 mg, 12.5 mg, Intramuscular, Q6H PRN, [facility], DO    calcium replacement protocol, , Other, RX Placeholder, [place], [place]    metoprolol (LOPRESSOR) injection 5 mg, 5 mg, Intravenous, Q6H PRN, [place], [place], 5 mg at 07/07/20 0000  Prior to Admission medications    Not on File     Additional information:       PHYSICAL:   Heart:  [x]Regular rate and rhythm  []Other:    Lungs:  [x]Clear    []Other:    Abdomen: [x]Soft    []Other:    Mental Status: [x]Alert & Oriented  []Other:      VITAL SIGNS   Patient Vitals for the past 24 hrs:   BP Temp Temp src Pulse Resp SpO2 Height Weight   07/07/20 1107 122/75 98.2 °F (36.8 °C) Oral 69 18 96 % -- --   07/07/20 1000 127/75 98.1 °F (36.7 °C) Oral 89 20 96 % -- --   07/07/20 0530 -- 98.8 °F (37.1 °C) Rectal -- -- -- -- --   07/07/20 0332 123/83 98.5 °F (36.9 °C) Oral 91 20 95 % -- 148 lb 11.2 oz (67.4 kg)   07/07/20 0000 128/83 99.4 °F (37.4 °C) Oral 80 18 94 % -- --   07/06/20 2301 -- 101 °F (38.3 °C) Rectal -- -- -- -- -- 07/06/20 2245 137/77 -- -- 102 -- 93 % -- --   07/06/20 2000 (!) 151/97 99.3 °F (37.4 °C) Oral 103 20 94 % -- --   07/06/20 1827 (!) 141/96 -- -- 101 -- 95 % -- --   07/06/20 1525 (!) 144/87 98.6 °F (37 °C) Oral 80 20 91 % 5' 10\" (1.778 m) --       PLANNED PROCEDURE  []EGD  []Colonoscopy []Flex Sigmoid  [x]ERCP []EUS   []Cystoscopy  [] CATH [] BRONCH   Consent: I have discussed with the patient and/or the patient representative the indication, alternatives, and the possible risks and/or complications of the planned procedure and the anesthesia methods. The patient and/or patient representative appear to understand and agree to proceed. SEDATION  Planned agent:[]Midazolam []Meperidine []Sublimaze []Morphine  []Diazepam [x]Propofol  []Other:     ASA Classification: Class 3 - A patient with severe systemic disease that limits activity but is not incapacitating    Airway Assessment: normal    Monitoring and Safety: The patient will be placed on a cardiac monitor and vital signs, pulse oximetry and level of consciousness will be continuously evaluated throughout the procedure. The patient will be closely monitored until recovery from the medications is complete and the patient has returned to baseline status. Respiratory therapy will be on standby during the procedure. [x]Pre-procedure diagnostic studies complete and results available. Comment:    [x]Previous sedation/anesthesia experiences assessed. Comment:    [x]The patient is an appropriate candidate to undergo the planned procedure sedation and anesthesia. (Refer to nursing sedation/analgesia documentation record)  [x]Formulation and discussion of sedation/procedure plan, risks, and expectations with patient and/or responsible adult completed. [x]Patient examined immediately prior to the procedure.  (Refer to nursing sedation/analgesia documentation record)    Isabel Rivera MD   Electronically signed 7/7/2020 at 2:42 PM

## 2020-07-07 NOTE — CARE COORDINATION
7/7/20, 7:29 AM EDT  DISCHARGE PLANNING EVALUATION:    Jennifer Moraes       Admitted from: ED 7/6/2020/ [address] [place] [address] day: 1   Location: [facility]07/007 Reason for admit: Jaundice [R17] Status: IP  Admit order signed?: yes  PMH:  has a past medical history of Coronary artery disease involving native coronary artery of native heart without angina pectoris and History of heart artery stent. Procedure: ERCP with stent placement pending with Dr. Darryn Youssef  Pertinent abnormal Imaging:none  Medications:  Scheduled Meds:   sodium chloride flush  10 mL Intravenous 2 times per day    polyethylene glycol  17 g Oral Daily    [Held by provider] enoxaparin  40 mg Subcutaneous Daily    famotidine (PEPCID) injection  20 mg Intravenous BID    piperacillin-tazobactam  3.375 g Intravenous Q8H    indomethacin  100 mg Rectal Once    calcium replacement protocol   Other RX Placeholder     Continuous Infusions:   sodium chloride 150 mL/hr at 07/07/20 0004      Pertinent Info/Orders/Treatment Plan:  WBC 16.9, Na 122, COVID (-), abnormal hepatic function studies, IV fluids, IV Pepcid, GI consult, telemetry, pain and nausea control. Diet: Diet NPO, After Midnight   Smoking status:  reports that he has never smoked.  He has never used smokeless tobacco.   PCP: JOSE Montoya CNP  Readmission 30 days or less: no  Readmission Risk Score: 12%    Discharge Planning Evaluation  Current Residence:  Private Residence  Living Arrangements:  Spouse/Significant Other   Support Systems:  Spouse/Significant Other, Children  Current Services PTA:     Potential Assistance Needed:  Prescription Assistance  Potential Assistance Purchasing Medications:  No  Does patient want to participate in local refill/ meds to beds program?  Yes  Type of Home Care Services:  None  Patient expects to be discharged to:  home with wife  Expected Discharge date:  07/09/20  Follow Up Appointment: Best Day/ Time: Wednesday PM    Patient Goals/Plan/Treatment Preferences: Met with Mallory Foster. He currently lives at home with his wife. Plan is to return home at discharge. He denies needs for DME and declines HH. Transportation/Food Security/Housekeeping Addressed:  No issues identified.     Evaluation: no

## 2020-07-07 NOTE — ANESTHESIA PRE PROCEDURE
Department of Anesthesiology  Preprocedure Note       Name:  Shanda Miller   Age:  65 y.o.  :  1950                                          MRN:  [id number]         Date:  2020      Surgeon: Aurelia Villa):  Claudio Ennis MD    Procedure: Procedure(s):  ERCP DIAGNOSTIC    Medications prior to admission:   Prior to Admission medications    Not on File       Current medications:    Current Facility-Administered Medications   Medication Dose Route Frequency Provider Last Rate Last Dose    0.45 % sodium chloride infusion   Intravenous Continuous Syed Hahn MD 75 mL/hr at 20 1230      sodium chloride flush 0.9 % injection 10 mL  10 mL Intravenous 2 times per day [facility], DO   10 mL at 20 1007    sodium chloride flush 0.9 % injection 10 mL  10 mL Intravenous PRN [facility], DO        acetaminophen (TYLENOL) tablet 650 mg  650 mg Oral Q6H PRN [facility], DO   650 mg at 20 2301    Or    acetaminophen (TYLENOL) suppository 650 mg  650 mg Rectal Q6H PRN [facility], DO        polyethylene glycol (GLYCOLAX) packet 17 g  17 g Oral Daily Trey Neely DO        ondansetron (ZOFRAN) injection 4 mg  4 mg Intravenous Q6H PRN [facility], DO        [Held by provider] enoxaparin (LOVENOX) injection 40 mg  40 mg Subcutaneous Daily [facility], DO        magnesium sulfate 2 g in 50 mL IVPB premix  2 g Intravenous PRN [facility], DO        potassium chloride (KLOR-CON M) extended release tablet 40 mEq  40 mEq Oral PRN [facility], DO        Or    potassium bicarb-citric acid (EFFER-K) effervescent tablet 40 mEq  40 mEq Oral PRN [facility], DO        Or    potassium chloride 10 mEq/100 mL IVPB (Peripheral Line)  10 mEq Intravenous PRN [facility], DO        famotidine (PEPCID) injection 20 mg  20 mg Intravenous BID Trey Neely, DO   20 mg at 20 1130    piperacillin-tazobactam (ZOSYN) 3.375 g in dextrose 5 % 50 mL IVPB extended infusion (mini-bag)  3.375 g Intravenous Q8H Bautista Van, APRN - CNP 12.5 mL/hr at 07/07/20 1130 3.375 g at 07/07/20 1130    indomethacin (INDOCIN) 50 MG suppository 100 mg  100 mg Rectal Once Coralee Roes, APRN - CNP        promethazine (PHENERGAN) injection 12.5 mg  12.5 mg Intramuscular Q6H PRN Grecia Hunt DO        calcium replacement protocol   Other RX Placeholder [place], [place]        metoprolol (LOPRESSOR) injection 5 mg  5 mg Intravenous Q6H PRN [facility], [place]   5 mg at 07/07/20 0000       Allergies:  No Known Allergies    Problem List:    Patient Active Problem List   Diagnosis Code    EKG abnormalities R94.31    Abnormal stress ECG inferior wall infarct or moderate degree with destini infarct  ischemia R94.39    Coronary artery disease involving native coronary artery of native heart without angina pectoris I25.10    Angina, class II ([address] I20.9    Syncope and collapse R55    Weight loss R63.4    History of heart artery stent Z95.5    Jaundice R17    Liver masses R16.0    Pancreatic mass K86.89    Obstructive jaundice due to malignant neoplasm (HCC) K83.1, C80.1    Non-intractable vomiting with nausea R11.2    Pain of upper abdomen R10.10       Past Medical History:        Diagnosis Date    Coronary artery disease involving native coronary artery of native heart without angina pectoris 7/12/2017    History of heart artery stent 2017       Past Surgical History:        Procedure Laterality Date    CARDIAC CATHETERIZATION N/A     2 stents placed by Dr. Víctor Monterroso  07/12/2017    right giant scrotal-Dr Atilio Akins       Social History:    Social History     Tobacco Use    Smoking status: Never Smoker    Smokeless tobacco: Never Used   Substance Use Topics    Alcohol use: Yes     Comment: social                                Counseling given: Not Answered      Vital Signs (Current):   Vitals:    07/07/20 0332 07/07/20 0530 07/07/20 1000 07/07/20 1107   BP: 123/83  127/75 122/75   Pulse: 91  89 69   Resp: 20 20 18   Temp: 36.9 °C (98.5 °F) 37.1 °C (98.8 °F) 36.7 °C (98.1 °F) 36.8 °C (98.2 °F)   TempSrc: Oral Rectal Oral Oral   SpO2: 95%  96% 96%   Weight: 148 lb 11.2 oz (67.4 kg)      Height:                                                  BP Readings from Last 3 Encounters:   07/07/20 122/75   06/30/20 124/76   06/25/20 122/86       NPO Status: Time of last liquid consumption: 2300                        Time of last solid consumption: 0900                        Date of last liquid consumption: 07/06/20                        Date of last solid food consumption: 07/06/20    BMI:   Wt Readings from Last 3 Encounters:   07/07/20 148 lb 11.2 oz (67.4 kg)   06/30/20 140 lb (63.5 kg)   06/25/20 142 lb 3.2 oz (64.5 kg)     Body mass index is 21.34 kg/m². CBC:   Lab Results   Component Value Date    WBC 16.9 07/07/2020    RBC 3.79 07/07/2020    HGB 11.3 07/07/2020    HCT 30.9 07/07/2020    MCV 81.5 07/07/2020    RDW 14.0 07/26/2017     07/07/2020       CMP:   Lab Results   Component Value Date     07/07/2020    K 3.9 07/07/2020    K 3.9 07/07/2020    CL 93 07/07/2020    CO2 25 07/07/2020    BUN 11 07/07/2020    CREATININE < 0.2 07/07/2020    LABGLOM >90 07/07/2020    GLUCOSE 143 07/07/2020    PROT 4.5 07/07/2020    CALCIUM 7.8 07/07/2020    BILITOT 12.5 07/07/2020    ALKPHOS 258 07/07/2020    AST 29 07/07/2020    ALT 51 07/07/2020       POC Tests: No results for input(s): POCGLU, POCNA, POCK, POCCL, POCBUN, POCHEMO, POCHCT in the last 72 hours.     Coags:   Lab Results   Component Value Date    INR 1.31 07/07/2020    APTT 29.4 07/07/2020       HCG (If Applicable): No results found for: PREGTESTUR, PREGSERUM, HCG, HCGQUANT     ABGs: No results found for: PHART, PO2ART, ZUT1WYU, GER9YLU, BEART, A8MOYVHS     Type & Screen (If Applicable):  Lab Results   Component Value Date    LAB POS 07/26/2017       Drug/Infectious Status (If Applicable):  No results found for: HIV, HEPCAB    COVID-19 Screening (If Applicable):   Lab Results   Component Value Date    COVID19 NOT DETECTED 07/07/2020    COVID19 NOT DETECTED 06/30/2020         Anesthesia Evaluation  Patient summary reviewed no history of anesthetic complications:   Airway: Mallampati: II  TM distance: >3 FB   Neck ROM: full  Mouth opening: > = 3 FB Dental: normal exam         Pulmonary:normal exam  breath sounds clear to auscultation                            ROS comment: + for right pulmonary nodules   Cardiovascular:  Exercise tolerance: poor (<4 METS),   (+) CAD: no interval change,       ECG reviewed  Rhythm: regular  Rate: normal                 ROS comment: Denies chest pain or shortness of breath     Neuro/Psych:   Negative Neuro/Psych ROS              GI/Hepatic/Renal:   (+) liver disease:,          ROS comment: + liver mass, + pancreatic mass. Endo/Other: Negative Endo/Other ROS             Pt had no PAT visit       Abdominal:       Abdomen: soft. Vascular: negative vascular ROS. Anesthesia Plan      general     ASA 3       Induction: intravenous. Anesthetic plan and risks discussed with patient and spouse. Plan discussed with CRNA.                   JOSE Bhardwaj - CRNA   7/7/2020

## 2020-07-07 NOTE — PROGRESS NOTES
Hospitalist Progress Note    Patient:  Alanna Alvarenga      Unit/Bed:4A-07/007-A    YOB: 1950    MRN: [id number]       Acct: [de-identified]     PCP: JOSE Laurent CNP    Date of Admission: 7/6/2020    Active Hospital Problems    Diagnosis Date Noted    Liver masses [R16.0] 07/06/2020    Pancreatic mass [K86.89] 07/06/2020    Obstructive jaundice due to malignant neoplasm ([address] [K83.1, C80.1] 07/06/2020    Non-intractable vomiting with nausea [R11.2] 07/06/2020    Pain of upper abdomen [R10.10] 07/06/2020    Weight loss [R63.4] 06/01/2020       Assessment/Plan:    1. Obstructive jaundice likely due to malignant neoplasm (likely pancraetic origin): planned ERCP +/- stent placement +/- EUS and biopsy, CA 19-9 ordered. Will trend LFTs  2. Pancreatic cancer w/ mets: CT-guided liver Bx frpm 6/30 showed poorly differentiated carcinoma (CK7+).   favoring pancreatic primary. Will need Med?onc referral as OP  3. HypoNa: acute on chronic; severe, Na 122, likely hypovolemic; noted placed on NS at 150 cc/hr; repeated Na 127; changed IVF to 0.45% NS at 75 cc/hr. Will monitor Na closely q4h to monitor for overcorrection  4. Acute microcytic anemia: likely 2/2 GI loss/SURI/ACD. Hb stable at 11. 3. will monitor closely. Anemia W/U sent  5. Severe malnutrition: d/t#2. Will place dietician consult for nutritional supplemets. Depending on goals and philosophy of care, Pt will likely benefit from PPN or TPN. 6. PseudohypoCa 2/2 hypoalbuminemia  7. Hx of Cad/RUBI to LAD 2017: not on any meds; will clarify; further treatment depending on goals of care  8. Code status;  Full, palliative care to see re: code status and goals of care      Expected discharge date:  TBD         Disposition:      [] Home                             [] TCU                             [] Rehab                             [] Psych                             [] SNF                             [] [facility] [] Other- TBD    Chief Complaint:   Chief Complaint   Patient presents with    Abdominal Pain       Hospital Course: Patient was seen, examined and the medical chart was reviewed thoroughly today. In summary, 65 y.o.male admitted on 7/6/2020 for evaluation of obstructive jaundice. Subjective (past 24 hours):   denies N/V/CP/SOB. abdo pain well-managed. Medications:  Reviewed    Infusion Medications    sodium chloride 150 mL/hr at 07/07/20 0004     Scheduled Medications    sodium chloride flush  10 mL Intravenous 2 times per day    polyethylene glycol  17 g Oral Daily    [Held by provider] enoxaparin  40 mg Subcutaneous Daily    famotidine (PEPCID) injection  20 mg Intravenous BID    piperacillin-tazobactam  3.375 g Intravenous Q8H    indomethacin  100 mg Rectal Once    calcium replacement protocol   Other RX Placeholder     PRN Meds: sodium chloride flush, acetaminophen **OR** acetaminophen, [DISCONTINUED] promethazine **OR** ondansetron, magnesium sulfate, potassium chloride **OR** potassium alternative oral replacement **OR** potassium chloride, promethazine, metoprolol      Intake/Output Summary (Last 24 hours) at 7/7/2020 0811  Last data filed at 7/7/2020 0553  Gross per 24 hour   Intake 2789.76 ml   Output --   Net 2789.76 ml       Diet:  Diet NPO, After Midnight    Exam:  /83   Pulse 91   Temp 98.8 °F (37.1 °C) (Rectal)   Resp 20   Ht 5' 10\" (1.778 m)   Wt 148 lb 11.2 oz (67.4 kg)   SpO2 95%   BMI 21.34 kg/m²     General appearance: cachectic, A&O x3, looks ill, in no apparent distress  HEENT:  JUN  EOM intact. Neck: Supple, with full range of motion. No jugular venous distention. Trachea midline. Respiratory:   NL A/E bilat with no adventitious sounds   Cardiovascular:  normal S1/S2 with no murmurs/gallops  Abdomen: Soft, non-tender, non-distended, no rigidity or peritoneal signs  Musculoskeletal: NL symmetrical A/PROM bilat U/L extremities   Skin: No rashes.  No edema  Neurologic:  CN II-XII intact. NL symmetrical reflexes. NL gait and stance. NL Cerebellar exam. Power 5/5 all muscle groups U/L extremities. Toes downgoing  Capillary Refill: Brisk,< 3 seconds   Peripheral Pulses: +2 palpable, equal bilaterally        Labs:   Recent Labs     07/06/20  1348 07/07/20  0357   WBC 12.8* 16.9*   HGB 12.3* 11.3*   HCT 33.3* 30.9*   * 375     Recent Labs     07/06/20  1348 07/06/20  1846 07/06/20  2257 07/07/20  0357   * 124*  --  122*   K 4.0 3.9  --  3.9   CL 81* 83*  --  88*   CO2 29 29  --  24   BUN 9 10  --  11   CREATININE < 0.2* < 0.2*  --  < 0.2*   CALCIUM 8.1* 8.1*  --  7.8*   PHOS  --   --  3.5  --      Recent Labs     07/06/20  1348 07/07/20  0357   AST 43* 29   ALT 67* 51   BILIDIR >10.0* 9.2*   BILITOT 12.8* 12.5*   ALKPHOS 350* 258*     Recent Labs     07/06/20  1348 07/07/20  0357   INR 2.50* 1.31*     No results for input(s): CKTOTAL, TROPONINI in the last 72 hours. Urinalysis:      Lab Results   Component Value Date    NITRU NEGATIVE 07/07/2020    WBCUA 0-2 07/07/2020    BACTERIA FEW 07/07/2020    RBCUA 0-2 07/07/2020    BLOODU NEGATIVE 07/07/2020    SPECGRAV 1.025 06/25/2020    GLUCOSEU NEGATIVE 07/07/2020       Radiology:  Ct Guided Needle Placement    Result Date: 6/30/2020  CT-GUIDED LIVER BIOPSY: CLINICAL INFORMATION: 75-year-old male with liver lesion PERFORMED BY: Ursula Chambers. Jose Joe M.D. Anum Brooms: Right hepatic lobe APPROACH: Lateral aspect right upper quadrant of the abdomen NEEDLE: 19-gauge guiding needle, 20-gauge Bard biopsy gun. SPECIMENS OBTAINED: 4 20-gauge core specimens. SEDATION: Versed 1 mg and fentanyl 50 mcg , IV; the patient was sedated for 15 minutes during this procedure and monitored with EKG and pulse ox monitoring devices by a registered nurse. PROCEDURE: Signed informed consent was obtained prior to performing this procedure. The patient was placed on the CT scanner and sedated, as indicated above.  The skin was marked, prepped, and draped in a sterile fashion. Following local anesthesia and utilizing aseptic technique, the needle as indicated above was inserted into the lesion of interest. Appropriate position was documented with CT images. Biopsy specimens were then obtained utilizing coaxial technique. CT images were obtained following the procedure, and reveal no significant postbiopsy hemorrhage. The patient tolerated the procedure well. ALL CT SCANS AT THIS FACILITY use dose modulation, iterative reconstruction, and/or weight-based dosing when appropriate to reduce radiation dose to as low as reasonably achievable. Status post CT-guided liver biopsy. **This report has been created using voice recognition software. It may contain minor errors which are inherent in voice recognition technology. ** Final report electronically signed by Dr Clyde Malloy on 6/30/2020 12:54 PM    Ct Abdomen Pelvis W Iv Contrast Additional Contrast? Oral    Result Date: 6/22/2020  PROCEDURE: CT ABDOMEN PELVIS W IV CONTRAST CLINICAL INFORMATION: 64-year-old male with unintentional weight loss 20 pounds in 3 months. Intermittent mid abdominal pain . COMPARISON: CT scan 5/12/2017. TECHNIQUE: 5 mm axial CT images were obtained through the abdomen and pelvis after the administration of intravenous and oral contrast. Coronal and sagittal reconstructions were obtained. All CT scans at this facility use dose modulation, iterative reconstruction, and/or weight-based dosing when appropriate to reduce radiation dose to as low as reasonably achievable. FINDINGS: There are 2 pulmonary nodules which both measure 4 mm at the anterior aspect of the right lung base on images #12 and 13. Scarring is seen at the bilateral lung bases. The base of the heart is within normal limits. There are coronary artery calcifications. There is no pericardial effusion. There are innumerable low-density lesions scattered throughout the hepatic parenchyma.  This finding is new hepatic lobe APPROACH: Lateral aspect right upper quadrant of the abdomen NEEDLE: 19-gauge guiding needle, 20-gauge Bard biopsy gun. SPECIMENS OBTAINED: 4 20-gauge core specimens. SEDATION: Versed 1 mg and fentanyl 50 mcg , IV; the patient was sedated for 15 minutes during this procedure and monitored with EKG and pulse ox monitoring devices by a registered nurse. PROCEDURE: Signed informed consent was obtained prior to performing this procedure. The patient was placed on the CT scanner and sedated, as indicated above. The skin was marked, prepped, and draped in a sterile fashion. Following local anesthesia and utilizing aseptic technique, the needle as indicated above was inserted into the lesion of interest. Appropriate position was documented with CT images. Biopsy specimens were then obtained utilizing coaxial technique. CT images were obtained following the procedure, and reveal no significant postbiopsy hemorrhage. The patient tolerated the procedure well. ALL CT SCANS AT THIS FACILITY use dose modulation, iterative reconstruction, and/or weight-based dosing when appropriate to reduce radiation dose to as low as reasonably achievable. Status post CT-guided liver biopsy. **This report has been created using voice recognition software. It may contain minor errors which are inherent in voice recognition technology. ** Final report electronically signed by Dr Chaka Rajan on 6/30/2020 12:54 PM      Diet: Diet NPO, After Midnight    DVT prophylaxis: [] Lovenox                                 [x] SCDs                                 [] SQ Heparin                                 [] Encourage ambulation           [] Already on Anticoagulation       Code Status: Full Code      Active Hospital Problems    Diagnosis Date Noted    Liver masses [R16.0] 07/06/2020    Pancreatic mass [K86.89] 07/06/2020    Obstructive jaundice due to malignant neoplasm ([facility] [address] [K83.1, C80.1] 07/06/2020    Non-intractable vomiting with nausea [R11.2] 07/06/2020    Pain of upper abdomen [R10.10] 07/06/2020    Weight loss [R63.4] 06/01/2020           With RN in room, patient was updated about the treatment plan, all the questions and concerns were addressed.         Electronically signed by Cat Yarbrough MD on 7/7/2020 at 8:11 AM

## 2020-07-07 NOTE — PROGRESS NOTES
1525 Arouses to name on arrival to PACU , [facility] elevated   1535 awake and oriented , denies any needs Dr Maico Coburn to bedside asking that family get a chance to see pt , [place] and some of the children have not been able to see Dad     [phone] family to bedside ,   1600 visiting with family one at a time   80 pt wife to bedside , updating to   1625 meets criteria for discharge , transported to [facility]

## 2020-07-07 NOTE — PROGRESS NOTES
1639: Call received from Shayy Mcdonald with the [facility] in [facility], [address] [facility]. Patient has been accepted by Dr. Danielle Mars [facility]) and case was also discussed with Dr. Melvin Saleem (). Shayy Mcdonald requested a face sheet be faxed to her at [phone]. Lab results, last set of vital signs, and episodes of SVT were discussed with Shayy Mcdonald. Shayy Mcdonald stated she would reach out to Dr. Radha Nielsen to request a bed with telemetry. Awaiting bed assignment at this time. Andrei Dudley Supervisor, Dr. Rigo Martini, and NICOLAS KOWALSKI [facility] [employer] notified of potential transfer. 1852Eniashu Wallis with  [address] called this RN. Patient's bed assignment is 101[address] on [address]. Unit's phone number is [phone], address is [address] [place] [address]. [address]. Patient's wife, Shy Macias and son Dearkassy Li, updated. 1905: This RN called [facility] to schedule transport. Patient information and transport information provided to [place] [facility]. [place] stated possible transport time of 23:00 tonight. Transport request form and face sheet faxed to [facility] ([phone]). Patient and family updated. Waiting for call back for official transport time. 1955: This RN called to Will Grissom RN at . All questions answered at this time. Will Grissom stated visitor policies are 1 family member per day, and visiting hours at from 8am-9pm. Family updated. Heahter Cisneros RN informed that last dose MAR and chart need printed and sent with patient yet.

## 2020-07-08 NOTE — OP NOTE
[address] [place] [address]                                OPERATIVE REPORT    PATIENT NAME: Jaleel Whitfield                     :        1950  MED REC NO:   [id number]                           ROOM:       0007  ACCOUNT NO:   [de-identified]                           ADMIT DATE: 2020  PROVIDER:     TEA Gilman Fairchilds:  2020    PROCEDURE PERFORMED:  ERCP    SURGEON:  Ramos Arreguin MD    INDICATION FOR PROCEDURE:  The patient with history of jaundice,  metastatic disease with pancreatic mass 5.6 cm with metastasis in the  liver. The patient had a liver biopsy, which shows poorly  differentiated adenocarcinoma, likely arising from pancreatic etiology. The patient was admitted with nausea, vomiting, and cholangitis. Feeling a little better. See the preop note and the consultation from  yesterday for rest of clinicals. ASA CLASSIFICATION:  III. MEDICATIONS:  Per Anesthesia. BIOPSIES:  None. PHOTOGRAPHS:  Yes. BLOOD LOSS:  None. DESCRIPTION OF PROCEDURE:  Informed consent was obtained after  explaining risks and benefits of the procedure. Possible complications  including bleeding, perforation, reaction to medicine, inability to  perform ERCP because of the stricture, as well as pancreatitis which  carries _____ aspiration, sepsis and death were discussed afterwards. Wife and one son present during meeting. Afterwards, the patient was  intubated and sedated and the therapeutic ERCP scope was advanced  through oropharynx, esophagus, stomach into the duodenal bulb. The  patient having mass lesion and stricturing from that and scope could not  pass with multiple attempts. At that point, scope was withdrawn and the  stomach was decompressed and the scope was removed. The patient  tolerated the procedure. IMPRESSION:  1.   Small bowel stricture not allowing the ERCP scope to pass into

## 2020-07-08 NOTE — PROGRESS NOTES
[employer] called and they stated they would be about an hour and a half longer at this time due to a squad being in [address] and just now leaving. Updated patient on new approximate time to be transferred.

## 2020-07-08 NOTE — FLOWSHEET NOTE
[facility] PROGRESS NOTE      Patient: Arsen Flores  Room #: 4A-07/007-A            YOB: 1950  Age: 65 y.o. Gender: male            Admit Date & Time: 7/6/2020 12:36 PM    Assessment:  Sean Chua is a 65year old male. Patient is lying in bed with his adult son and another female in the room. Patient is newly diagnosed with pancreatis Cancer. Patient is calm at the time of this visit and told me he will be transferred tonight to [place] for a very delicate ad critical surgery. Patient's son and family present told me they will follow tomorrow morning to be with patient. Patient is a member of [address]. Interventions: This  provided active listening, encouragement, prayer for healing, safe travel and safe surgery.  also offered words of consolation and emotional support. Outcomes:  Pt and his family were grateful for the spiritual support and prayer provided. Family is also thankful for he care being provided by the staff and team here at [facility]. Monique's. Plan:  (Is there more work to be done regarding the spiritual needs identified? If so, provide details about your suggested plan for follow-up. DELETE THIS parathetical guide prior to filing!)  1. [facility] care will follow up in the morning during rounding if patient is still here to provide anointing of the sick   2. SC will continue to pray for this family as they seek further needs for their medical care.      Electronically signed by Angélica Irving on 7/7/2020 at 11:08 PM.  [address] [facility]  [phone]

## 2020-07-08 NOTE — PROGRESS NOTES
This RN called Avani Mccartney from [facility] in [place], [place] to inform them of patient being  by [employer] at this time and will be on their way to their facility.

## 2020-07-08 NOTE — DISCHARGE SUMMARY
Notified by RN that ERCP was unsuccessful and arrangement was made by GI for transfer IU d/t need for higher level care. Transfer papers were signed. Pt will be transferred in stable medical condition. Attached below is the full progress note from today:    Hospitalist Progress Note     Patient:  Candy Christopher                          Unit/Bed:4A-07/007-A     YOB: 1950     MRN: [id number]                                    Acct: [de-identified]      PCP: JOSE Rosado CNP     Date of Admission: 7/6/2020          Active Hospital Problems     Diagnosis Date Noted    Liver masses [R16.0] 07/06/2020    Pancreatic mass [K86.89] 07/06/2020    Obstructive jaundice due to malignant neoplasm ([address] [K83.1, C80.1] 07/06/2020    Non-intractable vomiting with nausea [R11.2] 07/06/2020    Pain of upper abdomen [R10.10] 07/06/2020    Weight loss [R63.4] 06/01/2020         Assessment/Plan:     1. Obstructive jaundice likely due to malignant neoplasm (likely pancraetic origin): planned ERCP +/- stent placement +/- EUS and biopsy, CA 19-9 ordered. Will trend LFTs  2. Pancreatic cancer w/ mets: CT-guided liver Bx frpm 6/30 showed poorly differentiated carcinoma (CK7+).   favoring pancreatic primary. Will need Med?onc referral as OP  3. HypoNa: acute on chronic; severe, Na 122, likely hypovolemic; noted placed on NS at 150 cc/hr; repeated Na 127; changed IVF to 0.45% NS at 75 cc/hr. Will monitor Na closely q4h to monitor for overcorrection  4. Acute microcytic anemia: likely 2/2 GI loss/SURI/ACD. Hb stable at 11. 3. will monitor closely. Anemia W/U sent  5. Severe malnutrition: d/t#2. Will place dietician consult for nutritional supplemets. Depending on goals and philosophy of care, Pt will likely benefit from PPN or TPN. 6. PseudohypoCa 2/2 hypoalbuminemia  7. Hx of Cad/RUBI to LAD 2017: not on any meds; will clarify; further treatment depending on goals of care  8. Code status;  Full, palliative care to see re: code status and goals of care     Expected discharge date:  TBD           Disposition:      []? Home                             []? [facility]                             []? Rehab                             []? Psych                             []? [facility]  [address]                             []? Other- TBD     Chief Complaint:       Chief Complaint   Patient presents with    Abdominal Pain         Hospital Course: Patient was seen, examined and the medical chart was reviewed thoroughly today. In summary, 65 y.o.male admitted on 7/6/2020 for evaluation of obstructive jaundice.        Subjective (past 24 hours):   denies N/V/CP/SOB.  abdo pain well-managed.        Medications:  Reviewed     Infusion Medications   Infusions Meds    sodium chloride 150 mL/hr at 07/07/20 0004         Scheduled Medications   Scheduled Medications    sodium chloride flush  10 mL Intravenous 2 times per day    polyethylene glycol  17 g Oral Daily    [Held by provider] enoxaparin  40 mg Subcutaneous Daily    famotidine (PEPCID) injection  20 mg Intravenous BID    piperacillin-tazobactam  3.375 g Intravenous Q8H    indomethacin  100 mg Rectal Once    calcium replacement protocol   Other RX Placeholder         PRN Meds:   PRN Medications   sodium chloride flush, acetaminophen **OR** acetaminophen, [DISCONTINUED] promethazine **OR** ondansetron, magnesium sulfate, potassium chloride **OR** potassium alternative oral replacement **OR** potassium chloride, promethazine, metoprolol           Intake/Output Summary (Last 24 hours) at 7/7/2020 0811  Last data filed at 7/7/2020 6643      Gross per 24 hour   Intake 2789.76 ml   Output --   Net 2789.76 ml         Diet:  Diet NPO, After Midnight     Exam:  /83   Pulse 91   Temp 98.8 °F (37.1 °C) (Rectal)   Resp 20   Ht 5' 10\" (1.778 m)   Wt 148 lb 11.2 oz (67.4 kg)   SpO2 95%   BMI 21.34 kg/m²      General appearance: cachectic, A&O x3, looks ill, in no apparent distress  HEENT:  JUN  EOM intact. Neck: Supple, with full range of motion. No jugular venous distention. Trachea midline. Respiratory:   NL A/E bilat with no adventitious sounds   Cardiovascular:  normal S1/S2 with no murmurs/gallops  Abdomen: Soft, non-tender, non-distended, no rigidity or peritoneal signs  Musculoskeletal: NL symmetrical A/PROM bilat U/L extremities   Skin: No rashes. No edema  Neurologic:  CN II-XII intact. NL symmetrical reflexes. NL gait and stance. NL Cerebellar exam. Power 5/5 all muscle groups U/L extremities. Toes downgoing  Capillary Refill: Brisk,< 3 seconds   Peripheral Pulses: +2 palpable, equal bilaterally           Labs:        Recent Labs     07/06/20 1348 07/07/20  0357   WBC 12.8* 16.9*   HGB 12.3* 11.3*   HCT 33.3* 30.9*   * 375             Recent Labs     07/06/20  1348 07/06/20  1846 07/06/20  2257 07/07/20  0357   * 124*  --  122*   K 4.0 3.9  --  3.9   CL 81* 83*  --  88*   CO2 29 29  --  24   BUN 9 10  --  11   CREATININE < 0.2* < 0.2*  --  < 0.2*   CALCIUM 8.1* 8.1*  --  7.8*   PHOS  --   --  3.5  --            Recent Labs     07/06/20  1348 07/07/20  0357   AST 43* 29   ALT 67* 51   BILIDIR >10.0* 9.2*   BILITOT 12.8* 12.5*   ALKPHOS 350* 258*           Recent Labs     07/06/20  1348 07/07/20  0357   INR 2.50* 1.31*      No results for input(s): CKTOTAL, TROPONINI in the last 72 hours.     Urinalysis:      Lab Results   Component Value Date     NITRU NEGATIVE 07/07/2020     WBCUA 0-2 07/07/2020     BACTERIA FEW 07/07/2020     RBCUA 0-2 07/07/2020     BLOODU NEGATIVE 07/07/2020     SPECGRAV 1.025 06/25/2020     GLUCOSEU NEGATIVE 07/07/2020         Radiology:  Ct Guided Needle Placement     Result Date: 6/30/2020  CT-GUIDED LIVER BIOPSY: CLINICAL INFORMATION: 75-year-old male with liver lesion PERFORMED BY: Brian Shay.  Wilian Morales M.D. [facility]: Right hepatic lobe APPROACH: Lateral aspect right upper quadrant of the abdomen NEEDLE: 19-gauge guiding needle, 20-gauge Bard biopsy gun. SPECIMENS OBTAINED: 4 20-gauge core specimens. SEDATION: Versed 1 mg and fentanyl 50 mcg , IV; the patient was sedated for 15 minutes during this procedure and monitored with EKG and pulse ox monitoring devices by a registered nurse. PROCEDURE: Signed informed consent was obtained prior to performing this procedure. The patient was placed on the CT scanner and sedated, as indicated above. The skin was marked, prepped, and draped in a sterile fashion. Following local anesthesia and utilizing aseptic technique, the needle as indicated above was inserted into the lesion of interest. Appropriate position was documented with CT images. Biopsy specimens were then obtained utilizing coaxial technique. CT images were obtained following the procedure, and reveal no significant postbiopsy hemorrhage. The patient tolerated the procedure well. ALL CT SCANS AT THIS FACILITY use dose modulation, iterative reconstruction, and/or weight-based dosing when appropriate to reduce radiation dose to as low as reasonably achievable.      Status post CT-guided liver biopsy. **This report has been created using voice recognition software. It may contain minor errors which are inherent in voice recognition technology. ** Final report electronically signed by Dr Kris Mccloud on 6/30/2020 12:54 PM     Ct Abdomen Pelvis W Iv Contrast Additional Contrast? Oral     Result Date: 6/22/2020  PROCEDURE: CT ABDOMEN PELVIS W IV CONTRAST CLINICAL INFORMATION: 64-year-old male with unintentional weight loss 20 pounds in 3 months. Intermittent mid abdominal pain . COMPARISON: CT scan 5/12/2017. TECHNIQUE: 5 mm axial CT images were obtained through the abdomen and pelvis after the administration of intravenous and oral contrast. Coronal and sagittal reconstructions were obtained.  All CT scans at this facility use dose modulation, iterative reconstruction, and/or weight-based dosing when appropriate to reduce radiation dose to as low as reasonably achievable. FINDINGS: There are 2 pulmonary nodules which both measure 4 mm at the anterior aspect of the right lung base on images #12 and 13. Scarring is seen at the bilateral lung bases. The base of the heart is within normal limits. There are coronary artery calcifications. There is no pericardial effusion. There are innumerable low-density lesions scattered throughout the hepatic parenchyma. This finding is new since the prior study. The gallbladder is markedly distended. The spleen and adrenal glands are within normal limits. There is a large cystic mass in the head of the pancreas. It measures approximately 5.6 x 4.8 cm on axial image #39. This is a new finding since the prior exam and highly concerning for malignancy. The kidneys are symmetric in size, shape and degree of enhancement. There is no hydronephrosis. There is no evidence of a small bowel obstruction. There is no colonic wall thickening or edema. The appendix has a normal appearance without inflammatory changes. The urinary bladder has a normal appearance. There are bilateral inguinal hernias. The left side contains fat and a loop of bowel while the right side only contain fat. There is no free intraperitoneal air. Degenerative changes are seen throughout the spine. There are no acute fractures.      1. Cystic mass in the head of the pancreas measuring 5.6 cm in maximum dimension. This is highly concerning for a neoplasm. This is a new finding since the previous CT scan 5/12/2017. 2. Innumerable low-density lesions throughout the hepatic parenchyma highly concerning for metastatic disease. This is also a new finding since the previous exam. 3. 2 pulmonary nodules measuring 4 mm are seen at the right lung base anteriorly. Consider further evaluation with IV contrast enhanced thoracic CT. 4. Markedly distended gallbladder. 5. Bilateral inguinal hernias.  Both contain mesenteric fat and on the left side there is a loop of small bowel. **This report has been created using voice recognition software. It may contain minor errors which are inherent in voice recognition technology. ** Final report electronically signed by Dr Kris Mccloud on 6/22/2020 2:44 PM     Ct Needle Biopsy Liver Percutaneous     Result Date: 6/30/2020  CT-GUIDED LIVER BIOPSY: CLINICAL INFORMATION: 75-year-old male with liver lesion PERFORMED BY: Tessa Beckman. Mack Márquez M.D. Salvatorek[facility] Osmany: Right hepatic lobe APPROACH: Lateral aspect right upper quadrant of the abdomen NEEDLE: 19-gauge guiding needle, 20-gauge Bard biopsy gun. SPECIMENS OBTAINED: 4 20-gauge core specimens. SEDATION: Versed 1 mg and fentanyl 50 mcg , IV; the patient was sedated for 15 minutes during this procedure and monitored with EKG and pulse ox monitoring devices by a registered nurse. PROCEDURE: Signed informed consent was obtained prior to performing this procedure. The patient was placed on the CT scanner and sedated, as indicated above. The skin was marked, prepped, and draped in a sterile fashion. Following local anesthesia and utilizing aseptic technique, the needle as indicated above was inserted into the lesion of interest. Appropriate position was documented with CT images. Biopsy specimens were then obtained utilizing coaxial technique. CT images were obtained following the procedure, and reveal no significant postbiopsy hemorrhage. The patient tolerated the procedure well. ALL CT SCANS AT THIS FACILITY use dose modulation, iterative reconstruction, and/or weight-based dosing when appropriate to reduce radiation dose to as low as reasonably achievable.      Status post CT-guided liver biopsy. **This report has been created using voice recognition software. It may contain minor errors which are inherent in voice recognition technology. ** Final report electronically signed by Dr Kris Mccloud on 6/30/2020 12:54 PM        Diet: Diet NPO, After Midnight     DVT prophylaxis: []?  Lovenox                                 [x]? SCDs                                 []? SQ Heparin                                 []? Encourage ambulation                                 []? Already on Anticoagulation        Code Status: Full Code             Active Hospital Problems     Diagnosis Date Noted    Liver masses [R16.0] 07/06/2020    Pancreatic mass [K86.89] 07/06/2020    Obstructive jaundice due to malignant neoplasm ([facility] [address] [K83.1, C80.1] 07/06/2020    Non-intractable vomiting with nausea [R11.2] 07/06/2020    Pain of upper abdomen [R10.10] 07/06/2020    Weight loss [R63.4] 06/01/2020               With RN in room, patient was updated about the treatment plan, all the questions and concerns were addressed.           Electronically signed by Luh Nunez MD on 7/7/2020 at 8:11 AM

## 2020-07-09 NOTE — TELEPHONE ENCOUNTER
[facility] Trans[facility] Initial Follow Up Call    Outreach made within 2 business days of discharge: Yes    Patient: Mulugeta Lezama Patient : 1950   MRN: [id number]  Reason for Admission: There are no discharge diagnoses documented for the most recent discharge. Discharge Date: 20       Spoke with: Call was not placed as patient is being transferred to [facility] in [place], [place].     Discharge department/facility: [facility]    T  Scheduled appointment with PCP within 7-14 days    Follow Up  Future Appointments   Date Time Provider Lenoie Carranza   2020  9:00 AM [id number] [facility], [address] [facility] Marycruz Reynoso

## 2020-08-04 NOTE — TELEPHONE ENCOUNTER
Hospice called. Oncology ordered Hospice on patient. They want to know If Marcelo Rothman will be willing to follow for Hospice. I did inform them Marcelo Rothman is out of the office until Monday. They are needing some scripts sent to them. They are going to send some scripts to be signed to Dr. Evaristo Mccarthy. Would you be will to sign script for patient?

## 2022-05-29 NOTE — PROGRESS NOTES
0954 Pt in specials hold room for CT guided liver biopsy. Explained procedure to pt and pt verbalizes understanding. Consent signed. 9901 Dr Esperanza Addison to speak to pt. Consent signed. 1005 Pt positioned supine on table and attached to monitor. Pre biopsy scans taken. [address] Dr Blade Farrell pre scans. 56 Dr Jose Antonio Caraballo reviewing scans with Dr Esperanza Addison. 26 Dr Jose Antonio Caraballo to speak to pt.  4395 Scan complete with contrast.  1033 First sample obtained per Dr Jose Antonio Caraballo. Awaiting pathology. [id number] Pathologist present. Pt cont to rest quietly. Offers no complaints. 1038 Additional samples obtained. 1041 Biopsy complete. Needle removed and post scan taken. 1042 Triple antibiotic ointment applied to site on right lateral side with band-aid. Site without redness, swelling or hematoma. 1044 Pt positioned on cart for comfort. 1047 Report called to [facility].  6011 Pt transferred to [facility] per cart. Denies any pain. Update given to wife.
1055 pt back from procedure. Vitals stable. bandaid clean, dry and intact. Pt has pain 3/10. Pt provided with lunch.
18 Called and spoke to pt for follow-up biopsy. States doing \"Fine. \" Denies any pain, redness, swelling or hematoma at biopsy site. Offers no complaints.
Formulation and discussion of sedation / procedure plans, risks, benefits, side effects and alternatives with patient and/or responsible adult completed. Electronically signed by Mulugeta Dacosta.  Indira Barry MD on 6/30/2020 at 10:06 AM
discharge criteria   Monitor procedure site and notify MD of any issues
PAST SURGICAL HISTORY:  No significant past surgical history

## 2022-10-24 NOTE — PROGRESS NOTES
This RN called wife Pavel Curry and informed her of patient being transferred by [facility] to [facility] in [place], [place]. Intoxication

## (undated) DEVICE — JELLY,LUBE,STERILE,FLIP TOP,TUBE,2-OZ: Brand: MEDLINE

## (undated) DEVICE — RETRIEVAL BALLOON CATHETER: Brand: EXTRACTOR™ PRO RX

## (undated) DEVICE — CONTAINER,SPECIMEN,PNEU TUBE,4OZ,OR STRL: Brand: MEDLINE

## (undated) DEVICE — SPHINCTEROTOME: Brand: HYDRATOME RX 44

## (undated) DEVICE — CONTRAST IOTHALAMATE MEGLUMINE 60% 50 ML INJ CONRAY 60

## (undated) DEVICE — KIT INF CTRL 2OZ LUB TBNG L12FT DBL END BRSH SYR OP4

## (undated) DEVICE — BIOGUARD A/W CLEANING ADAPTER

## (undated) DEVICE — CONMED SCOPE SAVER BITE BLOCK, 20X27 MM: Brand: SCOPE SAVER

## (undated) DEVICE — ELECTRODE PT RET AD L9FT HI MOIST COND ADH HYDRGEL CORDED

## (undated) DEVICE — SET LNR RED GRN W/ BASE CLEANASCOPE

## (undated) DEVICE — 4-PORT MANIFOLD: Brand: NEPTUNE 2